# Patient Record
Sex: FEMALE | Race: OTHER | HISPANIC OR LATINO | URBAN - METROPOLITAN AREA
[De-identification: names, ages, dates, MRNs, and addresses within clinical notes are randomized per-mention and may not be internally consistent; named-entity substitution may affect disease eponyms.]

---

## 2017-01-03 ENCOUNTER — OUTPATIENT (OUTPATIENT)
Dept: OUTPATIENT SERVICES | Facility: HOSPITAL | Age: 32
LOS: 1 days | End: 2017-01-03
Payer: COMMERCIAL

## 2017-01-03 DIAGNOSIS — O47.03 FALSE LABOR BEFORE 37 COMPLETED WEEKS OF GESTATION, THIRD TRIMESTER: ICD-10-CM

## 2017-01-03 PROCEDURE — G0463: CPT

## 2017-01-03 PROCEDURE — 59025 FETAL NON-STRESS TEST: CPT

## 2017-01-03 PROCEDURE — 59050 FETAL MONITOR W/REPORT: CPT

## 2017-01-27 ENCOUNTER — OUTPATIENT (OUTPATIENT)
Dept: OUTPATIENT SERVICES | Facility: HOSPITAL | Age: 32
LOS: 1 days | End: 2017-01-27
Payer: COMMERCIAL

## 2017-01-27 DIAGNOSIS — O47.03 FALSE LABOR BEFORE 37 COMPLETED WEEKS OF GESTATION, THIRD TRIMESTER: ICD-10-CM

## 2017-01-27 PROCEDURE — G0463: CPT

## 2017-01-27 PROCEDURE — 59025 FETAL NON-STRESS TEST: CPT

## 2017-02-10 ENCOUNTER — INPATIENT (INPATIENT)
Facility: HOSPITAL | Age: 32
LOS: 5 days | Discharge: HOME | End: 2017-02-16
Attending: SPECIALIST | Admitting: SPECIALIST
Payer: COMMERCIAL

## 2017-02-10 VITALS — HEIGHT: 58 IN | WEIGHT: 158.73 LBS

## 2017-02-10 DIAGNOSIS — O47.1 FALSE LABOR AT OR AFTER 37 COMPLETED WEEKS OF GESTATION: ICD-10-CM

## 2017-02-10 LAB
BASOPHILS # BLD AUTO: 0 K/UL — SIGNIFICANT CHANGE UP (ref 0–0.2)
BASOPHILS NFR BLD AUTO: 0.1 % — SIGNIFICANT CHANGE UP (ref 0–2)
BLD GP AB SCN SERPL QL: SIGNIFICANT CHANGE UP
EOSINOPHIL # BLD AUTO: 0 K/UL — SIGNIFICANT CHANGE UP (ref 0–0.5)
EOSINOPHIL NFR BLD AUTO: 0.2 % — SIGNIFICANT CHANGE UP (ref 0–6)
HCT VFR BLD CALC: 39.1 % — SIGNIFICANT CHANGE UP (ref 37–47)
HGB BLD-MCNC: 13.3 G/DL — SIGNIFICANT CHANGE UP (ref 12–16)
LYMPHOCYTES # BLD AUTO: 18.4 % — LOW (ref 20–55)
LYMPHOCYTES # BLD AUTO: 2.4 K/UL — SIGNIFICANT CHANGE UP (ref 1–4.8)
MCHC RBC-ENTMCNC: 31.4 PG — HIGH (ref 27–31)
MCHC RBC-ENTMCNC: 34 G/DL — SIGNIFICANT CHANGE UP (ref 32–36)
MCV RBC AUTO: 92.4 FL — SIGNIFICANT CHANGE UP (ref 81–99)
MONOCYTES # BLD AUTO: 0.8 K/UL — SIGNIFICANT CHANGE UP (ref 0–0.8)
MONOCYTES NFR BLD AUTO: 6.4 % — SIGNIFICANT CHANGE UP (ref 3–10)
NEUTROPHILS # BLD AUTO: 9.6 K/UL — HIGH (ref 1.8–8)
NEUTROPHILS NFR BLD AUTO: 74.7 % — HIGH (ref 37–73)
PLATELET # BLD AUTO: 247 K/UL — SIGNIFICANT CHANGE UP (ref 150–400)
RBC # BLD: 4.23 M/UL — LOW (ref 4.4–5.2)
RBC # FLD: 14 % — SIGNIFICANT CHANGE UP (ref 11–15.6)
TYPE + AB SCN PNL BLD: SIGNIFICANT CHANGE UP
WBC # BLD: 12.86 K/UL — HIGH (ref 4.8–10.8)
WBC # FLD AUTO: 12.86 K/UL — HIGH (ref 4.8–10.8)

## 2017-02-10 RX ORDER — OXYTOCIN 10 UNIT/ML
333.33 VIAL (ML) INJECTION
Qty: 20 | Refills: 0 | Status: DISCONTINUED | OUTPATIENT
Start: 2017-02-10 | End: 2017-02-13

## 2017-02-10 RX ORDER — CITRIC ACID/SODIUM CITRATE 300-500 MG
15 SOLUTION, ORAL ORAL EVERY 4 HOURS
Qty: 0 | Refills: 0 | Status: DISCONTINUED | OUTPATIENT
Start: 2017-02-10 | End: 2017-02-13

## 2017-02-10 RX ORDER — SODIUM CHLORIDE 9 MG/ML
1000 INJECTION, SOLUTION INTRAVENOUS
Qty: 0 | Refills: 0 | Status: DISCONTINUED | OUTPATIENT
Start: 2017-02-10 | End: 2017-02-12

## 2017-02-10 RX ORDER — SODIUM CHLORIDE 9 MG/ML
1000 INJECTION, SOLUTION INTRAVENOUS ONCE
Qty: 0 | Refills: 0 | Status: COMPLETED | OUTPATIENT
Start: 2017-02-10 | End: 2017-02-12

## 2017-02-11 DIAGNOSIS — O36.8990 MATERNAL CARE FOR OTHER SPECIFIED FETAL PROBLEMS, UNSPECIFIED TRIMESTER, NOT APPLICABLE OR UNSPECIFIED: ICD-10-CM

## 2017-02-11 DIAGNOSIS — Z3A.38 38 WEEKS GESTATION OF PREGNANCY: ICD-10-CM

## 2017-02-11 DIAGNOSIS — O35.8XX9 MATERNAL CARE FOR OTHER (SUSPECTED) FETAL ABNORMALITY AND DAMAGE, OTHER FETUS: ICD-10-CM

## 2017-02-11 DIAGNOSIS — O99.213 OBESITY COMPLICATING PREGNANCY, THIRD TRIMESTER: ICD-10-CM

## 2017-02-11 DIAGNOSIS — E34.3 SHORT STATURE DUE TO ENDOCRINE DISORDER: ICD-10-CM

## 2017-02-11 LAB — RPR SERPL-ACNC: SIGNIFICANT CHANGE UP

## 2017-02-11 PROCEDURE — 99233 SBSQ HOSP IP/OBS HIGH 50: CPT | Mod: GC

## 2017-02-11 RX ADMIN — SODIUM CHLORIDE 125 MILLILITER(S): 9 INJECTION, SOLUTION INTRAVENOUS at 02:30

## 2017-02-11 NOTE — CONSULT NOTE ADULT - SUBJECTIVE AND OBJECTIVE BOX
Pt is 30y/o  with LMP of 2016 and SABINA 2017. EGA of 38 weeks and 1 day. Pt had a non-stress test yesterday which was suspicious for fetal heart rate deceleration. An office ultrasound examination reveal normal amniotic fluid volume. Pt sent to hospital for prolonged fetal heart monitoring. Review of fetal heart tracing by me today reveals episodic mild variable decelerations. She was diagnosed with a marginal umbilical cord insertion and a persistent fetal right umbilical vein on an ultrasound examination done on . She is of short statue and overweight (BMI is 32.0).       PAST MEDICAL & SURGICAL HISTORY: Unremarkable.     Allergies: No Known Allergies    FAMILY HISTORY:    Social History: Denies ETOH, smoking and drugs.       Vital Signs Last 24 Hrs  T(C): --36.8  HR: --96  BP: -- 124/83  RR: --16    Physical Exam:  General: NAD  CVS: RRR, +S1/S2  Lungs: CTAB, no wheeze, rhonchi or rales.   Abdomen: +BS, soft, NT. Gravid.  Pelvic: Deferred  Ext: No cyanosis, edema or calf tenderness. Blanching skin.    Labs:                          13.3   12.86 )-----------( 247      ( 10 Feb 2017 17:40 )             39.1       MEDICATIONS  (STANDING):  lactated ringers Bolus 1000milliLiter(s) IV Bolus once  lactated ringers. 1000milliLiter(s) IV Continuous <Continuous>  citric acid/sodium citrate Solution 15milliLiter(s) Oral every 4 hours  oxytocin Infusion 333.333milliUNIT(s)/Min IV Continuous <Continuous>  misoprostol Oral Solution 40MICROGram(s) Oral every 2 hours  misoprostol Oral Solution 60MICROGram(s) Oral every 2 hours  misoprostol Oral Solution 20MICROGram(s) Oral every 2 hours  misoprostol Oral Solution 40MICROGram(s) Oral every 2 hours  misoprostol Oral Solution 60MICROGram(s) Oral every 2 hours Pt is 30y/o  with LMP of 2016 and SABINA 2017. EGA of 38 weeks and 1 day. Pt had a non-stress test yesterday which was suspicious for fetal heart rate deceleration. An office ultrasound examination reveal normal amniotic fluid volume. Pt sent to hospital for prolonged fetal heart monitoring. Review of fetal heart tracing by me today reveals episodic mild variable decelerations. She was diagnosed with a marginal umbilical cord insertion and a persistent fetal right umbilical vein on an ultrasound examination done on . She is of short statue and overweight (BMI is 32.0).       PAST MEDICAL & SURGICAL HISTORY: LGSIL Pap smear. Cold sores.     Allergies: No Known Allergies    FAMILY HISTORY: Mother with diabetes.    Social History: Denies ETOH, smoking and drugs.       Vital Signs Last 24 Hrs  T(C): --36.8  HR: --96  BP: -- 124/83  RR: --16    Physical Exam:  General: NAD  CVS: RRR, +S1/S2  Lungs: CTAB, no wheeze, rhonchi or rales.   Abdomen: +BS, soft, NT. Gravid.  Pelvic: Deferred  Ext: No cyanosis, edema or calf tenderness. Blanching skin.    Labs:                          13.3   12.86 )-----------( 247      ( 10 Feb 2017 17:40 )             39.1       MEDICATIONS  (STANDING):  lactated ringers Bolus 1000milliLiter(s) IV Bolus once  lactated ringers. 1000milliLiter(s) IV Continuous <Continuous>  citric acid/sodium citrate Solution 15milliLiter(s) Oral every 4 hours  oxytocin Infusion 333.333milliUNIT(s)/Min IV Continuous <Continuous>  misoprostol Oral Solution 40MICROGram(s) Oral every 2 hours  misoprostol Oral Solution 60MICROGram(s) Oral every 2 hours  misoprostol Oral Solution 20MICROGram(s) Oral every 2 hours  misoprostol Oral Solution 40MICROGram(s) Oral every 2 hours  misoprostol Oral Solution 60MICROGram(s) Oral every 2 hours

## 2017-02-11 NOTE — CONSULT NOTE ADULT - PROBLEM SELECTOR RECOMMENDATION 3
Persistent right umbilical vein is considered to be a normal variant. Suggest labor induction due to episodic mild variable decelerations at 38 weeks of gestation.

## 2017-02-11 NOTE — CONSULT NOTE ADULT - PROBLEM SELECTOR RECOMMENDATION 9
Suggest labor induction due to episodic mild variable decelerations at 38 weeks of gestation. In the event of recurrent variable decelerations during the labor induction or early labor, a  delivery is suggested. Suggest labor induction due to the episodic mild variable decelerations at 38 weeks of gestation. In the event of recurrent variable decelerations during the labor induction or early labor, a  delivery is suggested

## 2017-02-11 NOTE — CONSULT NOTE ADULT - PROBLEM SELECTOR RECOMMENDATION 4
Suggest labor induction due to episodic mild variable decelerations at 38 weeks of gestation. Suggest labor induction due to episodic mild variable decelerations at 38 weeks of gestation

## 2017-02-11 NOTE — CONSULT NOTE ADULT - ASSESSMENT
Pt is 30y/o  with LMP of 2016 and SABINA 2017. EGA of 38 weeks and 1 day. Episodic variable decelerations most likely due to cord compression secondary to the marginal umbilical cord insertion. Fetal persistent right umbilical vein is considered to be a normal variant. She is short and overweight. Pt is 30y/o  with LMP of 2016 and SABINA 2017. EGA of 38 weeks and 1 day. Episodic fetal mild variable decelerations on fetal monitoring strip are most likely due to cord compression secondary to the marginal umbilical cord insertion. Fetal persistent right umbilical vein is considered to be a normal variant. She is short and overweight.

## 2017-02-11 NOTE — CONSULT NOTE ADULT - ATTENDING COMMENTS
Patient examined with resident. Recommendations discussed with resident, OB provider and L & D RN staff

## 2017-02-12 RX ORDER — CEFAZOLIN SODIUM 1 G
2000 VIAL (EA) INJECTION ONCE
Qty: 0 | Refills: 0 | Status: DISCONTINUED | OUTPATIENT
Start: 2017-02-12 | End: 2017-02-13

## 2017-02-12 RX ORDER — CITRIC ACID/SODIUM CITRATE 300-500 MG
30 SOLUTION, ORAL ORAL ONCE
Qty: 0 | Refills: 0 | Status: COMPLETED | OUTPATIENT
Start: 2017-02-12 | End: 2017-02-13

## 2017-02-12 RX ORDER — SODIUM CHLORIDE 9 MG/ML
1000 INJECTION, SOLUTION INTRAVENOUS
Qty: 0 | Refills: 0 | Status: DISCONTINUED | OUTPATIENT
Start: 2017-02-12 | End: 2017-02-13

## 2017-02-12 RX ADMIN — SODIUM CHLORIDE 125 MILLILITER(S): 9 INJECTION, SOLUTION INTRAVENOUS at 00:25

## 2017-02-12 RX ADMIN — SODIUM CHLORIDE 125 MILLILITER(S): 9 INJECTION, SOLUTION INTRAVENOUS at 13:00

## 2017-02-12 RX ADMIN — SODIUM CHLORIDE 125 MILLILITER(S): 9 INJECTION, SOLUTION INTRAVENOUS at 18:12

## 2017-02-12 RX ADMIN — SODIUM CHLORIDE 2000 MILLILITER(S): 9 INJECTION, SOLUTION INTRAVENOUS at 17:53

## 2017-02-13 ENCOUNTER — TRANSCRIPTION ENCOUNTER (OUTPATIENT)
Age: 32
End: 2017-02-13

## 2017-02-13 LAB
BASE EXCESS BLDCOA CALC-SCNC: -4.3 MMOL/L — SIGNIFICANT CHANGE UP (ref -11.6–0.4)
BASE EXCESS BLDCOV CALC-SCNC: -3.8 MMOL/L — SIGNIFICANT CHANGE UP (ref -9.3–0.3)
GAS PNL BLDCOV: 7.26 — SIGNIFICANT CHANGE UP (ref 7.11–7.36)
HCO3 BLDCOA-SCNC: 24 MMOL/L — SIGNIFICANT CHANGE UP (ref 15–27)
HCO3 BLDCOV-SCNC: 24 MMOL/L — SIGNIFICANT CHANGE UP (ref 17–25)
PCO2 BLDCOA: 60.8 MMHG — SIGNIFICANT CHANGE UP (ref 32.2–65.8)
PCO2 BLDCOV: 54.7 MMHG — HIGH (ref 27–49.4)
PH BLDCOA: 7.22 — SIGNIFICANT CHANGE UP (ref 7.11–7.36)
PO2 BLDCOA: 17 MMHG — SIGNIFICANT CHANGE UP (ref 6–30)
PO2 BLDCOA: 22 MMHG — SIGNIFICANT CHANGE UP (ref 17.4–41)
SAO2 % BLDCOA: SIGNIFICANT CHANGE UP
SAO2 % BLDCOV: SIGNIFICANT CHANGE UP

## 2017-02-13 RX ORDER — TETANUS TOXOID, REDUCED DIPHTHERIA TOXOID AND ACELLULAR PERTUSSIS VACCINE, ADSORBED 5; 2.5; 8; 8; 2.5 [IU]/.5ML; [IU]/.5ML; UG/.5ML; UG/.5ML; UG/.5ML
0.5 SUSPENSION INTRAMUSCULAR ONCE
Qty: 0 | Refills: 0 | Status: DISCONTINUED | OUTPATIENT
Start: 2017-02-13 | End: 2017-02-16

## 2017-02-13 RX ORDER — OXYTOCIN 10 UNIT/ML
333.33 VIAL (ML) INJECTION
Qty: 20 | Refills: 0 | Status: DISCONTINUED | OUTPATIENT
Start: 2017-02-13 | End: 2017-02-16

## 2017-02-13 RX ORDER — IBUPROFEN 200 MG
600 TABLET ORAL EVERY 6 HOURS
Qty: 0 | Refills: 0 | Status: DISCONTINUED | OUTPATIENT
Start: 2017-02-13 | End: 2017-02-16

## 2017-02-13 RX ORDER — DIPHENHYDRAMINE HCL 50 MG
25 CAPSULE ORAL EVERY 4 HOURS
Qty: 0 | Refills: 0 | Status: DISCONTINUED | OUTPATIENT
Start: 2017-02-13 | End: 2017-02-16

## 2017-02-13 RX ORDER — KETOROLAC TROMETHAMINE 30 MG/ML
30 SYRINGE (ML) INJECTION ONCE
Qty: 0 | Refills: 0 | Status: DISCONTINUED | OUTPATIENT
Start: 2017-02-13 | End: 2017-02-16

## 2017-02-13 RX ORDER — NALBUPHINE HYDROCHLORIDE 10 MG/ML
5 INJECTION, SOLUTION INTRAMUSCULAR; INTRAVENOUS; SUBCUTANEOUS ONCE
Qty: 0 | Refills: 0 | Status: DISCONTINUED | OUTPATIENT
Start: 2017-02-13 | End: 2017-02-16

## 2017-02-13 RX ORDER — ONDANSETRON 8 MG/1
4 TABLET, FILM COATED ORAL EVERY 6 HOURS
Qty: 0 | Refills: 0 | Status: DISCONTINUED | OUTPATIENT
Start: 2017-02-13 | End: 2017-02-16

## 2017-02-13 RX ORDER — KETOROLAC TROMETHAMINE 30 MG/ML
30 SYRINGE (ML) INJECTION EVERY 6 HOURS
Qty: 0 | Refills: 0 | Status: DISCONTINUED | OUTPATIENT
Start: 2017-02-13 | End: 2017-02-16

## 2017-02-13 RX ORDER — SODIUM CHLORIDE 9 MG/ML
1000 INJECTION, SOLUTION INTRAVENOUS
Qty: 0 | Refills: 0 | Status: DISCONTINUED | OUTPATIENT
Start: 2017-02-13 | End: 2017-02-16

## 2017-02-13 RX ORDER — OXYTOCIN 10 UNIT/ML
41.67 VIAL (ML) INJECTION
Qty: 20 | Refills: 0 | Status: DISCONTINUED | OUTPATIENT
Start: 2017-02-13 | End: 2017-02-16

## 2017-02-13 RX ORDER — ONDANSETRON 8 MG/1
4 TABLET, FILM COATED ORAL ONCE
Qty: 0 | Refills: 0 | Status: DISCONTINUED | OUTPATIENT
Start: 2017-02-13 | End: 2017-02-16

## 2017-02-13 RX ORDER — FERROUS SULFATE 325(65) MG
325 TABLET ORAL DAILY
Qty: 0 | Refills: 0 | Status: DISCONTINUED | OUTPATIENT
Start: 2017-02-13 | End: 2017-02-16

## 2017-02-13 RX ORDER — SIMETHICONE 80 MG/1
80 TABLET, CHEWABLE ORAL EVERY 4 HOURS
Qty: 0 | Refills: 0 | Status: DISCONTINUED | OUTPATIENT
Start: 2017-02-13 | End: 2017-02-16

## 2017-02-13 RX ORDER — DOCUSATE SODIUM 100 MG
100 CAPSULE ORAL
Qty: 0 | Refills: 0 | Status: DISCONTINUED | OUTPATIENT
Start: 2017-02-13 | End: 2017-02-16

## 2017-02-13 RX ORDER — ACETAMINOPHEN 500 MG
1000 TABLET ORAL ONCE
Qty: 0 | Refills: 0 | Status: COMPLETED | OUTPATIENT
Start: 2017-02-13 | End: 2017-02-13

## 2017-02-13 RX ORDER — NALBUPHINE HYDROCHLORIDE 10 MG/ML
2.5 INJECTION, SOLUTION INTRAMUSCULAR; INTRAVENOUS; SUBCUTANEOUS EVERY 6 HOURS
Qty: 0 | Refills: 0 | Status: DISCONTINUED | OUTPATIENT
Start: 2017-02-13 | End: 2017-02-16

## 2017-02-13 RX ORDER — DIPHENHYDRAMINE HCL 50 MG
25 CAPSULE ORAL EVERY 6 HOURS
Qty: 0 | Refills: 0 | Status: DISCONTINUED | OUTPATIENT
Start: 2017-02-13 | End: 2017-02-16

## 2017-02-13 RX ORDER — GLYCERIN ADULT
1 SUPPOSITORY, RECTAL RECTAL AT BEDTIME
Qty: 0 | Refills: 0 | Status: DISCONTINUED | OUTPATIENT
Start: 2017-02-13 | End: 2017-02-16

## 2017-02-13 RX ORDER — NALOXONE HYDROCHLORIDE 4 MG/.1ML
0.1 SPRAY NASAL
Qty: 0 | Refills: 0 | Status: DISCONTINUED | OUTPATIENT
Start: 2017-02-13 | End: 2017-02-16

## 2017-02-13 RX ORDER — LANOLIN
1 OINTMENT (GRAM) TOPICAL
Qty: 0 | Refills: 0 | Status: DISCONTINUED | OUTPATIENT
Start: 2017-02-13 | End: 2017-02-16

## 2017-02-13 RX ORDER — ACETAMINOPHEN 500 MG
650 TABLET ORAL EVERY 6 HOURS
Qty: 0 | Refills: 0 | Status: DISCONTINUED | OUTPATIENT
Start: 2017-02-13 | End: 2017-02-16

## 2017-02-13 RX ADMIN — Medication 30 MILLILITER(S): at 08:38

## 2017-02-13 RX ADMIN — Medication 1000 MILLIGRAM(S): at 13:35

## 2017-02-13 RX ADMIN — Medication 100 MILLIGRAM(S): at 09:17

## 2017-02-13 RX ADMIN — SODIUM CHLORIDE 125 MILLILITER(S): 9 INJECTION, SOLUTION INTRAVENOUS at 17:53

## 2017-02-13 RX ADMIN — Medication 400 MILLIGRAM(S): at 12:45

## 2017-02-13 RX ADMIN — Medication 125 MILLIUNIT(S)/MIN: at 14:28

## 2017-02-13 NOTE — DISCHARGE NOTE OB - PATIENT PORTAL LINK FT
“You can access the [employer] Patient Portal, offered by [facility], by registering with the following website: [email or web address]”

## 2017-02-13 NOTE — DISCHARGE NOTE OB - CARE PROVIDER_API CALL
Lisbeth Blas), Obstetrics and Gynecology  [address] [place] [address]  Phone: [phone]  Fax: [phone]

## 2017-02-13 NOTE — DISCHARGE NOTE OB - CARE PLAN
Principal Discharge DX:	 delivery delivered  Goal:	Recover  Instructions for follow-up, activity and diet:	Call for fevers, chills, severe pain, heavy vaginal bleeding, nausea, vomiting, or any other concerning symptoms.  Nothing in vagina and no heavy lifting x 6 weeks.  No driving x 2 weeks.  F/U 2 weeks for incision check.  Secondary Diagnosis:	Other iron deficiency anemia

## 2017-02-13 NOTE — DISCHARGE NOTE OB - MEDICATION SUMMARY - MEDICATIONS TO TAKE
I will START or STAY ON the medications listed below when I get home from the hospital:    acetaminophen-oxyCODONE 325 mg-5 mg oral tablet  -- 2 tab(s) by mouth every 4 hours -for moderate pain MDD:12 tabs  -- Caution federal law prohibits the transfer of this drug to any person other  than the person for whom it was prescribed.  May cause drowsiness.  Alcohol may intensify this effect.  Use care when operating dangerous machinery.  This prescription cannot be refilled.  This product contains acetaminophen.  Do not use  with any other product containing acetaminophen to prevent possible liver damage.  Using more of this medication than prescribed may cause serious breathing problems.    -- Indication: For pain    ibuprofen 600 mg oral tablet  -- 1 tab(s) by mouth every 6 hours -for mild pain  -- Do not take this drug if you are pregnant.  It is very important that you take or use this exactly as directed.  Do not skip doses or discontinue unless directed by your doctor.  May cause drowsiness or dizziness.  Obtain medical advice before taking any non-prescription drugs as some may affect the action of this medication.  Take with food or milk.    -- Indication: For pain

## 2017-02-13 NOTE — DISCHARGE NOTE OB - HOSPITAL COURSE
Pt admitted for IOL. She received cytotec and pitocin and never progressed. Pt underwent primary C/S. Had an uncomplicated delivery and PP course.

## 2017-02-13 NOTE — DISCHARGE NOTE OB - PLAN OF CARE
Recover Call for fevers, chills, severe pain, heavy vaginal bleeding, nausea, vomiting, or any other concerning symptoms.  Nothing in vagina and no heavy lifting x 6 weeks.  No driving x 2 weeks.  F/U 2 weeks for incision check.

## 2017-02-13 NOTE — DISCHARGE NOTE OB - MATERIALS PROVIDED
Breastfeeding Mother’s Support Group Information/Guide to Postpartum Care/Birth Certificate Instructions/Tdap Vaccination (VIS Pub Date: 2012)/MMR Vaccination (VIS Pub Date: 2012)/Breastfeeding Guide and Packet/[facility]  Screening Program/Shaken Baby Prevention Handout/[facility] Hearing Screen Program

## 2017-02-14 ENCOUNTER — TRANSCRIPTION ENCOUNTER (OUTPATIENT)
Age: 32
End: 2017-02-14

## 2017-02-14 LAB
BASOPHILS # BLD AUTO: 0 K/UL — SIGNIFICANT CHANGE UP (ref 0–0.2)
BASOPHILS NFR BLD AUTO: 0.1 % — SIGNIFICANT CHANGE UP (ref 0–2)
EOSINOPHIL # BLD AUTO: 0 K/UL — SIGNIFICANT CHANGE UP (ref 0–0.5)
EOSINOPHIL NFR BLD AUTO: 0.3 % — SIGNIFICANT CHANGE UP (ref 0–6)
HCT VFR BLD CALC: 29.8 % — LOW (ref 37–47)
HGB BLD-MCNC: 10 G/DL — LOW (ref 12–16)
LYMPHOCYTES # BLD AUTO: 2.3 K/UL — SIGNIFICANT CHANGE UP (ref 1–4.8)
LYMPHOCYTES # BLD AUTO: 20.3 % — SIGNIFICANT CHANGE UP (ref 20–55)
MCHC RBC-ENTMCNC: 31.1 PG — HIGH (ref 27–31)
MCHC RBC-ENTMCNC: 33.6 G/DL — SIGNIFICANT CHANGE UP (ref 32–36)
MCV RBC AUTO: 92.5 FL — SIGNIFICANT CHANGE UP (ref 81–99)
MONOCYTES # BLD AUTO: 1.1 K/UL — HIGH (ref 0–0.8)
MONOCYTES NFR BLD AUTO: 9.2 % — SIGNIFICANT CHANGE UP (ref 3–10)
NEUTROPHILS # BLD AUTO: 8.1 K/UL — HIGH (ref 1.8–8)
NEUTROPHILS NFR BLD AUTO: 69.8 % — SIGNIFICANT CHANGE UP (ref 37–73)
PLATELET # BLD AUTO: 177 K/UL — SIGNIFICANT CHANGE UP (ref 150–400)
RBC # BLD: 3.22 M/UL — LOW (ref 4.4–5.2)
RBC # FLD: 14.2 % — SIGNIFICANT CHANGE UP (ref 11–15.6)
WBC # BLD: 11.53 K/UL — HIGH (ref 4.8–10.8)
WBC # FLD AUTO: 11.53 K/UL — HIGH (ref 4.8–10.8)

## 2017-02-14 RX ADMIN — Medication 325 MILLIGRAM(S): at 12:11

## 2017-02-14 RX ADMIN — Medication 600 MILLIGRAM(S): at 12:11

## 2017-02-14 RX ADMIN — Medication 1 TABLET(S): at 12:11

## 2017-02-14 RX ADMIN — Medication 100 MILLIGRAM(S): at 12:11

## 2017-02-14 RX ADMIN — Medication 600 MILLIGRAM(S): at 13:01

## 2017-02-14 RX ADMIN — Medication 30 MILLIGRAM(S): at 03:52

## 2017-02-14 RX ADMIN — SIMETHICONE 80 MILLIGRAM(S): 80 TABLET, CHEWABLE ORAL at 12:11

## 2017-02-14 RX ADMIN — Medication 600 MILLIGRAM(S): at 17:50

## 2017-02-14 NOTE — PROGRESS NOTE ADULT - SUBJECTIVE AND OBJECTIVE BOX
~Postpartum Note,  Section, /PRIMARY; MALE ; RH POSITIVE/NEGATIVE;   She is a  31y woman who is now post-operative day:     Subjective:  The patient feels well.  She is ambulating.   She is tolerating regular diet.      Physical exam:    Vital Signs Last 24 Hrs  T(C): 36.9, Max: 37.2 (02-13 @ 20:57)  T(F): 98.4, Max: 99 (02-13 @ 20:57)  HR: 76 (67 - 89)  BP: 108/71 (91/60 - 120/58)  BP(mean): --  RR: 18 (17 - 20)  SpO2: 96% (96% - 98%)    Gen: NAD    Abdomen: Soft, nontender, no distension , firm uterine fundus at umbilicus.  Incision: Clean, dry, and intact with steri strips  Pelvic: Normal lochia noted  Ext: No calf tenderness    LABS:                        10.0   11.53 )-----------( 177      ( 2017 07:12 )             29.8       Rubella status: Immune           Assessment and Plan  POD # 1  s/p  section   Doing well.  Encourage ambulation.

## 2017-02-15 RX ADMIN — Medication 325 MILLIGRAM(S): at 12:09

## 2017-02-15 RX ADMIN — Medication 1 TABLET(S): at 12:08

## 2017-02-15 RX ADMIN — Medication 600 MILLIGRAM(S): at 22:30

## 2017-02-15 RX ADMIN — Medication 600 MILLIGRAM(S): at 02:45

## 2017-02-15 RX ADMIN — Medication 600 MILLIGRAM(S): at 21:37

## 2017-02-15 RX ADMIN — Medication 600 MILLIGRAM(S): at 12:09

## 2017-02-15 RX ADMIN — Medication 600 MILLIGRAM(S): at 03:40

## 2017-02-15 RX ADMIN — Medication 600 MILLIGRAM(S): at 13:10

## 2017-02-15 RX ADMIN — Medication 100 MILLIGRAM(S): at 12:08

## 2017-02-15 NOTE — PROGRESS NOTE ADULT - SUBJECTIVE AND OBJECTIVE BOX
POD#2 s/p  section  male  O POSITIVE/Rubella Immune    S:  The patient has no complaints.  She is ambulating, voiding, tolerating PO, +flatus.    O:      T(C): 36.7, Max: 36.7 (02-14 @ 22:12)  HR: 93 (77 - 93)  BP: 120/83 (111/67 - 120/83)  RR: 18 (18 - 20)  SpO2: 95% (95% - 95%)    Hematocrit: 39.1 % -->Hematocrit: 29.8 %    Chest:  Clear to auscultation bilaterally.  CVS:  +S1, S2.  Abdomen:  Soft, non-tender, non-distended, +bowel sounds.  Uterus:  Fundus firm below umbilicus.  Incision:  Clean/dry/intact  VE:  +Lochia  Ext:  Non-tender.    A/P:  POD#2  -Stable  -Advance care.  -Continue routine postpartum/postoperative care and education.  -Declines circumcision.

## 2017-02-16 VITALS
DIASTOLIC BLOOD PRESSURE: 70 MMHG | SYSTOLIC BLOOD PRESSURE: 107 MMHG | HEART RATE: 66 BPM | TEMPERATURE: 98 F | RESPIRATION RATE: 18 BRPM

## 2017-02-16 PROCEDURE — 82803 BLOOD GASES ANY COMBINATION: CPT

## 2017-02-16 PROCEDURE — 85027 COMPLETE CBC AUTOMATED: CPT

## 2017-02-16 PROCEDURE — 86592 SYPHILIS TEST NON-TREP QUAL: CPT

## 2017-02-16 PROCEDURE — 36415 COLL VENOUS BLD VENIPUNCTURE: CPT

## 2017-02-16 PROCEDURE — 86900 BLOOD TYPING SEROLOGIC ABO: CPT

## 2017-02-16 PROCEDURE — 86850 RBC ANTIBODY SCREEN: CPT

## 2017-02-16 PROCEDURE — 86901 BLOOD TYPING SEROLOGIC RH(D): CPT

## 2017-02-16 RX ORDER — OXYCODONE HYDROCHLORIDE 5 MG/1
2 TABLET ORAL
Qty: 60 | Refills: 0 | OUTPATIENT
Start: 2017-02-16 | End: 2017-02-21

## 2017-02-16 RX ORDER — IBUPROFEN 200 MG
1 TABLET ORAL
Qty: 60 | Refills: 0 | OUTPATIENT
Start: 2017-02-16 | End: 2017-02-23

## 2017-02-16 RX ADMIN — Medication 600 MILLIGRAM(S): at 05:46

## 2017-02-16 RX ADMIN — Medication 600 MILLIGRAM(S): at 04:44

## 2017-02-16 RX ADMIN — Medication 325 MILLIGRAM(S): at 11:47

## 2017-02-16 RX ADMIN — Medication 600 MILLIGRAM(S): at 11:47

## 2017-02-16 RX ADMIN — Medication 1 TABLET(S): at 11:47

## 2017-02-16 NOTE — PROGRESS NOTE ADULT - SUBJECTIVE AND OBJECTIVE BOX
Postpartum Note,  Section  She is a  31y woman who is now post-operative day # 3    Subjective:  The patient feels well.  She is ambulating without difficulty.  She is tolerating PO.  She is voiding.  She denies nausea and vomiting.  Her pain is controlled.  She reports normal postpartum bleeding      Physical exam:    Vital Signs Last 24 Hrs  T(C): 36.8, Max: 37.1 (02-15 @ 21:45)  T(F): 98.2, Max: 98.8 (02-15 @ 21:45)  HR: 66 (66 - 77)  BP: 107/70 (104/67 - 107/70)  BP(mean): --  RR: 18 (18 - 20)  SpO2: 96% (96% - 96%)    Gen: NAD  Abdomen: Soft, nontender, no distension , firm uterine fundus at umbilicus.  Incision: Clean, dry, and intact with steri strips  Pelvic: Normal lochia noted  Ext: No calf tenderness    LABS: HCT 29.8

## 2017-02-16 NOTE — PROGRESS NOTE ADULT - ASSESSMENT
POD # 3 s/p primary C/S for failed induction    1) Doing well.  Encourage ambulation.  Discharge home today.  Prescriptions for percocet and motrin electronically sent to the pharmacy.  F/U in 2 weeks for incision check.  Call for fevers, chills, nausea, vomiting, heavy vaginal bleeding, vaginal discharge, severe pain, symptoms of depression, problems with incision or any other concerning symptoms.  Nothing in vagina and no heavy lifting x 6 weeks.  No driving x 2 weeks.  Questions answered.    2) Asymptomatic post anemia. Iron at dischrge.

## 2017-07-27 ENCOUNTER — RESULT REVIEW (OUTPATIENT)
Age: 32
End: 2017-07-27

## 2018-02-13 ENCOUNTER — ASOB RESULT (OUTPATIENT)
Age: 33
End: 2018-02-13

## 2018-02-13 ENCOUNTER — APPOINTMENT (OUTPATIENT)
Dept: MATERNAL FETAL MEDICINE | Facility: CLINIC | Age: 33
End: 2018-02-13
Payer: COMMERCIAL

## 2018-02-13 VITALS — BODY MASS INDEX: 32.95 KG/M2 | WEIGHT: 157 LBS | HEIGHT: 58 IN

## 2018-02-13 PROCEDURE — G0108 DIAB MANAGE TRN  PER INDIV: CPT

## 2018-02-15 ENCOUNTER — OTHER (OUTPATIENT)
Age: 33
End: 2018-02-15

## 2018-02-21 ENCOUNTER — APPOINTMENT (OUTPATIENT)
Dept: MATERNAL FETAL MEDICINE | Facility: CLINIC | Age: 33
End: 2018-02-21
Payer: COMMERCIAL

## 2018-02-21 ENCOUNTER — ASOB RESULT (OUTPATIENT)
Age: 33
End: 2018-02-21

## 2018-02-21 ENCOUNTER — APPOINTMENT (OUTPATIENT)
Dept: ANTEPARTUM | Facility: CLINIC | Age: 33
End: 2018-02-21
Payer: COMMERCIAL

## 2018-02-21 VITALS
HEART RATE: 82 BPM | DIASTOLIC BLOOD PRESSURE: 70 MMHG | WEIGHT: 154 LBS | HEIGHT: 58 IN | SYSTOLIC BLOOD PRESSURE: 114 MMHG | BODY MASS INDEX: 32.32 KG/M2 | OXYGEN SATURATION: 98 %

## 2018-02-21 DIAGNOSIS — O35.9XX0 MATERNAL CARE FOR (SUSPECTED) FETAL ABNORMALITY AND DAMAGE, UNSPECIFIED, NOT APPLICABLE OR UNSPECIFIED: ICD-10-CM

## 2018-02-21 DIAGNOSIS — Z83.3 FAMILY HISTORY OF DIABETES MELLITUS: ICD-10-CM

## 2018-02-21 DIAGNOSIS — O43.102 MALFORMATION OF PLACENTA, UNSPECIFIED, SECOND TRIMESTER: ICD-10-CM

## 2018-02-21 DIAGNOSIS — B00.9 HERPESVIRAL INFECTION, UNSPECIFIED: ICD-10-CM

## 2018-02-21 DIAGNOSIS — Z3A.22 22 WEEKS GESTATION OF PREGNANCY: ICD-10-CM

## 2018-02-21 DIAGNOSIS — O43.122 VELAMENTOUS INSERTION OF UMBILICAL CORD, SECOND TRIMESTER: ICD-10-CM

## 2018-02-21 PROCEDURE — 76819 FETAL BIOPHYS PROFIL W/O NST: CPT

## 2018-02-21 PROCEDURE — 76820 UMBILICAL ARTERY ECHO: CPT

## 2018-02-21 PROCEDURE — 76816 OB US FOLLOW-UP PER FETUS: CPT

## 2018-02-21 PROCEDURE — 93975 VASCULAR STUDY: CPT

## 2018-02-21 PROCEDURE — 99214 OFFICE O/P EST MOD 30 MIN: CPT

## 2018-03-01 ENCOUNTER — APPOINTMENT (OUTPATIENT)
Dept: MATERNAL FETAL MEDICINE | Facility: CLINIC | Age: 33
End: 2018-03-01
Payer: COMMERCIAL

## 2018-03-01 ENCOUNTER — ASOB RESULT (OUTPATIENT)
Age: 33
End: 2018-03-01

## 2018-03-01 VITALS — WEIGHT: 154.19 LBS | BODY MASS INDEX: 32.37 KG/M2 | HEIGHT: 58 IN

## 2018-03-01 PROCEDURE — G0108 DIAB MANAGE TRN  PER INDIV: CPT

## 2018-03-08 ENCOUNTER — OUTPATIENT (OUTPATIENT)
Dept: OUTPATIENT SERVICES | Facility: HOSPITAL | Age: 33
LOS: 1 days | End: 2018-03-08
Payer: COMMERCIAL

## 2018-03-08 VITALS — WEIGHT: 138.89 LBS

## 2018-03-08 DIAGNOSIS — Z01.818 ENCOUNTER FOR OTHER PREPROCEDURAL EXAMINATION: ICD-10-CM

## 2018-03-08 LAB
ANION GAP SERPL CALC-SCNC: 13 MMOL/L — SIGNIFICANT CHANGE UP (ref 5–17)
APPEARANCE UR: CLEAR — SIGNIFICANT CHANGE UP
BASOPHILS # BLD AUTO: 0 K/UL — SIGNIFICANT CHANGE UP (ref 0–0.2)
BASOPHILS NFR BLD AUTO: 0.2 % — SIGNIFICANT CHANGE UP (ref 0–2)
BILIRUB UR-MCNC: NEGATIVE — SIGNIFICANT CHANGE UP
BLD GP AB SCN SERPL QL: SIGNIFICANT CHANGE UP
BUN SERPL-MCNC: 11 MG/DL — SIGNIFICANT CHANGE UP (ref 8–20)
CALCIUM SERPL-MCNC: 9.3 MG/DL — SIGNIFICANT CHANGE UP (ref 8.6–10.2)
CHLORIDE SERPL-SCNC: 105 MMOL/L — SIGNIFICANT CHANGE UP (ref 98–107)
CO2 SERPL-SCNC: 20 MMOL/L — LOW (ref 22–29)
COLOR SPEC: YELLOW — SIGNIFICANT CHANGE UP
CREAT SERPL-MCNC: 0.6 MG/DL — SIGNIFICANT CHANGE UP (ref 0.5–1.3)
DIFF PNL FLD: NEGATIVE — SIGNIFICANT CHANGE UP
EOSINOPHIL # BLD AUTO: 0.1 K/UL — SIGNIFICANT CHANGE UP (ref 0–0.5)
EOSINOPHIL NFR BLD AUTO: 0.6 % — SIGNIFICANT CHANGE UP (ref 0–6)
EPI CELLS # UR: ABNORMAL
GLUCOSE SERPL-MCNC: 68 MG/DL — LOW (ref 70–115)
GLUCOSE UR QL: NEGATIVE MG/DL — SIGNIFICANT CHANGE UP
HCT VFR BLD CALC: 39.3 % — SIGNIFICANT CHANGE UP (ref 37–47)
HGB BLD-MCNC: 13.3 G/DL — SIGNIFICANT CHANGE UP (ref 12–16)
KETONES UR-MCNC: NEGATIVE — SIGNIFICANT CHANGE UP
LEUKOCYTE ESTERASE UR-ACNC: ABNORMAL
LYMPHOCYTES # BLD AUTO: 2.1 K/UL — SIGNIFICANT CHANGE UP (ref 1–4.8)
LYMPHOCYTES # BLD AUTO: 20.8 % — SIGNIFICANT CHANGE UP (ref 20–55)
MCHC RBC-ENTMCNC: 31.3 PG — HIGH (ref 27–31)
MCHC RBC-ENTMCNC: 33.8 G/DL — SIGNIFICANT CHANGE UP (ref 32–36)
MCV RBC AUTO: 92.5 FL — SIGNIFICANT CHANGE UP (ref 81–99)
MONOCYTES # BLD AUTO: 0.6 K/UL — SIGNIFICANT CHANGE UP (ref 0–0.8)
MONOCYTES NFR BLD AUTO: 5.4 % — SIGNIFICANT CHANGE UP (ref 3–10)
NEUTROPHILS # BLD AUTO: 7.5 K/UL — SIGNIFICANT CHANGE UP (ref 1.8–8)
NEUTROPHILS NFR BLD AUTO: 72.7 % — SIGNIFICANT CHANGE UP (ref 37–73)
NITRITE UR-MCNC: NEGATIVE — SIGNIFICANT CHANGE UP
PH UR: 7 — SIGNIFICANT CHANGE UP (ref 5–8)
PLATELET # BLD AUTO: 213 K/UL — SIGNIFICANT CHANGE UP (ref 150–400)
POTASSIUM SERPL-MCNC: 4.4 MMOL/L — SIGNIFICANT CHANGE UP (ref 3.5–5.3)
POTASSIUM SERPL-SCNC: 4.4 MMOL/L — SIGNIFICANT CHANGE UP (ref 3.5–5.3)
PROT UR-MCNC: NEGATIVE MG/DL — SIGNIFICANT CHANGE UP
RBC # BLD: 4.25 M/UL — LOW (ref 4.4–5.2)
RBC # FLD: 14.4 % — SIGNIFICANT CHANGE UP (ref 11–15.6)
SODIUM SERPL-SCNC: 138 MMOL/L — SIGNIFICANT CHANGE UP (ref 135–145)
SP GR SPEC: 1 — LOW (ref 1.01–1.02)
TYPE + AB SCN PNL BLD: SIGNIFICANT CHANGE UP
UROBILINOGEN FLD QL: NEGATIVE MG/DL — SIGNIFICANT CHANGE UP
WBC # BLD: 10.3 K/UL — SIGNIFICANT CHANGE UP (ref 4.8–10.8)
WBC # FLD AUTO: 10.3 K/UL — SIGNIFICANT CHANGE UP (ref 4.8–10.8)
WBC UR QL: SIGNIFICANT CHANGE UP /HPF (ref 0–5)

## 2018-03-08 PROCEDURE — 80048 BASIC METABOLIC PNL TOTAL CA: CPT

## 2018-03-08 PROCEDURE — 86901 BLOOD TYPING SEROLOGIC RH(D): CPT

## 2018-03-08 PROCEDURE — 86850 RBC ANTIBODY SCREEN: CPT

## 2018-03-08 PROCEDURE — 36415 COLL VENOUS BLD VENIPUNCTURE: CPT

## 2018-03-08 PROCEDURE — 86900 BLOOD TYPING SEROLOGIC ABO: CPT

## 2018-03-08 PROCEDURE — 81001 URINALYSIS AUTO W/SCOPE: CPT

## 2018-03-08 PROCEDURE — G0463: CPT

## 2018-03-08 PROCEDURE — 85027 COMPLETE CBC AUTOMATED: CPT

## 2018-03-19 ENCOUNTER — TRANSCRIPTION ENCOUNTER (OUTPATIENT)
Age: 33
End: 2018-03-19

## 2018-03-20 ENCOUNTER — TRANSCRIPTION ENCOUNTER (OUTPATIENT)
Age: 33
End: 2018-03-20

## 2018-03-20 ENCOUNTER — RESULT REVIEW (OUTPATIENT)
Age: 33
End: 2018-03-20

## 2018-03-20 ENCOUNTER — INPATIENT (INPATIENT)
Facility: HOSPITAL | Age: 33
LOS: 2 days | Discharge: HOME | End: 2018-03-23
Attending: SPECIALIST | Admitting: SPECIALIST
Payer: COMMERCIAL

## 2018-03-20 VITALS — WEIGHT: 152.12 LBS

## 2018-03-20 DIAGNOSIS — O34.219 MATERNAL CARE FOR UNSPECIFIED TYPE SCAR FROM PREVIOUS CESAREAN DELIVERY: ICD-10-CM

## 2018-03-20 LAB
BASE EXCESS BLDCOA CALC-SCNC: -3.7 MMOL/L — LOW (ref -2–2)
BASE EXCESS BLDCOV CALC-SCNC: -3.1 MMOL/L — LOW (ref -2–2)
BLD GP AB SCN SERPL QL: SIGNIFICANT CHANGE UP
GAS PNL BLDCOV: 7.33 — SIGNIFICANT CHANGE UP (ref 7.25–7.45)
HCO3 BLDCOA-SCNC: 20 MMOL/L — LOW (ref 21–29)
HCO3 BLDCOV-SCNC: 21 MMOL/L — SIGNIFICANT CHANGE UP (ref 21–29)
PCO2 BLDCOA: 50.2 MMHG — SIGNIFICANT CHANGE UP (ref 32–68)
PCO2 BLDCOV: 44 MMHG — SIGNIFICANT CHANGE UP (ref 29–53)
PH BLDCOA: 7.28 — SIGNIFICANT CHANGE UP (ref 7.18–7.38)
PO2 BLDCOA: 16.3 MMHG — SIGNIFICANT CHANGE UP (ref 5.7–30.5)
PO2 BLDCOA: 24.5 MMHG — SIGNIFICANT CHANGE UP (ref 17–41)
SAO2 % BLDCOA: SIGNIFICANT CHANGE UP
SAO2 % BLDCOV: SIGNIFICANT CHANGE UP
TYPE + AB SCN PNL BLD: SIGNIFICANT CHANGE UP

## 2018-03-20 PROCEDURE — 88305 TISSUE EXAM BY PATHOLOGIST: CPT | Mod: 26

## 2018-03-20 PROCEDURE — 88304 TISSUE EXAM BY PATHOLOGIST: CPT | Mod: 26

## 2018-03-20 PROCEDURE — 58611 LIGATE OVIDUCT(S) ADD-ON: CPT | Mod: 80

## 2018-03-20 PROCEDURE — 59514 CESAREAN DELIVERY ONLY: CPT | Mod: 80,U9

## 2018-03-20 RX ORDER — LANOLIN
1 OINTMENT (GRAM) TOPICAL
Qty: 0 | Refills: 0 | Status: DISCONTINUED | OUTPATIENT
Start: 2018-03-20 | End: 2018-03-23

## 2018-03-20 RX ORDER — GLYCERIN ADULT
1 SUPPOSITORY, RECTAL RECTAL AT BEDTIME
Qty: 0 | Refills: 0 | Status: DISCONTINUED | OUTPATIENT
Start: 2018-03-20 | End: 2018-03-23

## 2018-03-20 RX ORDER — IBUPROFEN 200 MG
600 TABLET ORAL EVERY 6 HOURS
Qty: 0 | Refills: 0 | Status: DISCONTINUED | OUTPATIENT
Start: 2018-03-20 | End: 2018-03-23

## 2018-03-20 RX ORDER — SODIUM CHLORIDE 9 MG/ML
1000 INJECTION, SOLUTION INTRAVENOUS
Qty: 0 | Refills: 0 | Status: DISCONTINUED | OUTPATIENT
Start: 2018-03-20 | End: 2018-03-20

## 2018-03-20 RX ORDER — CEFAZOLIN SODIUM 1 G
2000 VIAL (EA) INJECTION ONCE
Qty: 0 | Refills: 0 | Status: COMPLETED | OUTPATIENT
Start: 2018-03-20 | End: 2018-03-20

## 2018-03-20 RX ORDER — TETANUS TOXOID, REDUCED DIPHTHERIA TOXOID AND ACELLULAR PERTUSSIS VACCINE, ADSORBED 5; 2.5; 8; 8; 2.5 [IU]/.5ML; [IU]/.5ML; UG/.5ML; UG/.5ML; UG/.5ML
0.5 SUSPENSION INTRAMUSCULAR ONCE
Qty: 0 | Refills: 0 | Status: DISCONTINUED | OUTPATIENT
Start: 2018-03-20 | End: 2018-03-23

## 2018-03-20 RX ORDER — OXYTOCIN 10 UNIT/ML
41.67 VIAL (ML) INJECTION
Qty: 20 | Refills: 0 | Status: DISCONTINUED | OUTPATIENT
Start: 2018-03-20 | End: 2018-03-23

## 2018-03-20 RX ORDER — OXYTOCIN 10 UNIT/ML
333.33 VIAL (ML) INJECTION
Qty: 20 | Refills: 0 | Status: COMPLETED | OUTPATIENT
Start: 2018-03-20 | End: 2018-03-20

## 2018-03-20 RX ORDER — KETOROLAC TROMETHAMINE 30 MG/ML
30 SYRINGE (ML) INJECTION EVERY 6 HOURS
Qty: 0 | Refills: 0 | Status: DISCONTINUED | OUTPATIENT
Start: 2018-03-20 | End: 2018-03-23

## 2018-03-20 RX ORDER — NALOXONE HYDROCHLORIDE 4 MG/.1ML
0.1 SPRAY NASAL
Qty: 0 | Refills: 0 | Status: DISCONTINUED | OUTPATIENT
Start: 2018-03-20 | End: 2018-03-23

## 2018-03-20 RX ORDER — DIPHENHYDRAMINE HCL 50 MG
25 CAPSULE ORAL EVERY 6 HOURS
Qty: 0 | Refills: 0 | Status: DISCONTINUED | OUTPATIENT
Start: 2018-03-20 | End: 2018-03-23

## 2018-03-20 RX ORDER — ONDANSETRON 8 MG/1
4 TABLET, FILM COATED ORAL EVERY 6 HOURS
Qty: 0 | Refills: 0 | Status: DISCONTINUED | OUTPATIENT
Start: 2018-03-20 | End: 2018-03-23

## 2018-03-20 RX ORDER — CITRIC ACID/SODIUM CITRATE 300-500 MG
30 SOLUTION, ORAL ORAL ONCE
Qty: 0 | Refills: 0 | Status: COMPLETED | OUTPATIENT
Start: 2018-03-20 | End: 2018-03-20

## 2018-03-20 RX ORDER — DIPHENHYDRAMINE HCL 50 MG
25 CAPSULE ORAL EVERY 4 HOURS
Qty: 0 | Refills: 0 | Status: DISCONTINUED | OUTPATIENT
Start: 2018-03-20 | End: 2018-03-23

## 2018-03-20 RX ORDER — ACETAMINOPHEN 500 MG
1000 TABLET ORAL ONCE
Qty: 0 | Refills: 0 | Status: DISCONTINUED | OUTPATIENT
Start: 2018-03-20 | End: 2018-03-23

## 2018-03-20 RX ORDER — SIMETHICONE 80 MG/1
80 TABLET, CHEWABLE ORAL EVERY 4 HOURS
Qty: 0 | Refills: 0 | Status: DISCONTINUED | OUTPATIENT
Start: 2018-03-20 | End: 2018-03-23

## 2018-03-20 RX ORDER — OXYCODONE AND ACETAMINOPHEN 5; 325 MG/1; MG/1
1 TABLET ORAL
Qty: 0 | Refills: 0 | Status: DISCONTINUED | OUTPATIENT
Start: 2018-03-20 | End: 2018-03-23

## 2018-03-20 RX ORDER — FERROUS SULFATE 325(65) MG
325 TABLET ORAL DAILY
Qty: 0 | Refills: 0 | Status: DISCONTINUED | OUTPATIENT
Start: 2018-03-20 | End: 2018-03-23

## 2018-03-20 RX ORDER — KETOROLAC TROMETHAMINE 30 MG/ML
30 SYRINGE (ML) INJECTION ONCE
Qty: 0 | Refills: 0 | Status: DISCONTINUED | OUTPATIENT
Start: 2018-03-20 | End: 2018-03-20

## 2018-03-20 RX ORDER — DIPHENHYDRAMINE HCL 50 MG
25 CAPSULE ORAL ONCE
Qty: 0 | Refills: 0 | Status: DISCONTINUED | OUTPATIENT
Start: 2018-03-20 | End: 2018-03-23

## 2018-03-20 RX ORDER — ACETAMINOPHEN 500 MG
650 TABLET ORAL EVERY 6 HOURS
Qty: 0 | Refills: 0 | Status: DISCONTINUED | OUTPATIENT
Start: 2018-03-20 | End: 2018-03-23

## 2018-03-20 RX ORDER — ONDANSETRON 8 MG/1
4 TABLET, FILM COATED ORAL ONCE
Qty: 0 | Refills: 0 | Status: DISCONTINUED | OUTPATIENT
Start: 2018-03-20 | End: 2018-03-20

## 2018-03-20 RX ORDER — METOCLOPRAMIDE HCL 10 MG
10 TABLET ORAL ONCE
Qty: 0 | Refills: 0 | Status: DISCONTINUED | OUTPATIENT
Start: 2018-03-20 | End: 2018-03-20

## 2018-03-20 RX ORDER — SODIUM CHLORIDE 9 MG/ML
1000 INJECTION, SOLUTION INTRAVENOUS ONCE
Qty: 0 | Refills: 0 | Status: COMPLETED | OUTPATIENT
Start: 2018-03-20 | End: 2018-03-20

## 2018-03-20 RX ORDER — DOCUSATE SODIUM 100 MG
100 CAPSULE ORAL
Qty: 0 | Refills: 0 | Status: DISCONTINUED | OUTPATIENT
Start: 2018-03-20 | End: 2018-03-23

## 2018-03-20 RX ORDER — OXYTOCIN 10 UNIT/ML
41.67 VIAL (ML) INJECTION
Qty: 20 | Refills: 0 | Status: DISCONTINUED | OUTPATIENT
Start: 2018-03-20 | End: 2018-03-20

## 2018-03-20 RX ORDER — OXYCODONE AND ACETAMINOPHEN 5; 325 MG/1; MG/1
2 TABLET ORAL EVERY 6 HOURS
Qty: 0 | Refills: 0 | Status: DISCONTINUED | OUTPATIENT
Start: 2018-03-20 | End: 2018-03-23

## 2018-03-20 RX ORDER — SODIUM CHLORIDE 9 MG/ML
1000 INJECTION, SOLUTION INTRAVENOUS
Qty: 0 | Refills: 0 | Status: DISCONTINUED | OUTPATIENT
Start: 2018-03-20 | End: 2018-03-23

## 2018-03-20 RX ADMIN — Medication 1000 MILLIUNIT(S)/MIN: at 14:28

## 2018-03-20 RX ADMIN — Medication 30 MILLILITER(S): at 10:26

## 2018-03-20 RX ADMIN — SODIUM CHLORIDE 125 MILLILITER(S): 9 INJECTION, SOLUTION INTRAVENOUS at 11:03

## 2018-03-20 RX ADMIN — Medication 125 MILLIUNIT(S)/MIN: at 14:46

## 2018-03-20 RX ADMIN — Medication 30 MILLIGRAM(S): at 16:59

## 2018-03-20 RX ADMIN — Medication 100 MILLIGRAM(S): at 11:25

## 2018-03-20 RX ADMIN — SODIUM CHLORIDE 2000 MILLILITER(S): 9 INJECTION, SOLUTION INTRAVENOUS at 10:15

## 2018-03-20 RX ADMIN — Medication 30 MILLIGRAM(S): at 17:14

## 2018-03-20 NOTE — DISCHARGE NOTE OB - PATIENT PORTAL LINK FT
You can access the [facility], offered by [facility], by registering with the following website: [email or web address]

## 2018-03-20 NOTE — DISCHARGE NOTE OB - MEDICATION SUMMARY - MEDICATIONS TO TAKE
I will START or STAY ON the medications listed below when I get home from the hospital:    Percocet 5/325 oral tablet  -- 2 tab(s) by mouth every 6 hours, As needed, Severe Pain MDD:4  -- Indication: For  delivery delivered    ibuprofen 600 mg oral tablet  -- 1 tab(s) by mouth every 6 hours, As needed, Mild pain or headache  -- Indication: For  delivery delivered    docusate sodium 100 mg oral capsule  -- 1 cap(s) by mouth 2 times a day, As needed, Stool Softening  -- Indication: For  delivery delivered

## 2018-03-20 NOTE — DISCHARGE NOTE OB - CARE PLAN
Principal Discharge DX:	 delivery delivered  Goal:	Rapid recovery  Assessment and plan of treatment:	follow up in 1 week for wound check and 6 weeks for postpartum care. Call OB if you have any concerns.

## 2018-03-20 NOTE — DISCHARGE NOTE OB - MATERIALS PROVIDED
Vaccinations/[facility] Hearing Screen Program/Discharge Medication Information for Patients and Families Pocket Guide/Tdap Vaccination (VIS Pub Date: 2012)/[facility] Welcome Screening Program/Breastfeeding Mother’s Support Group Information/Guide to Postpartum Care/Back To Sleep Handout/Shaken Baby Prevention Handout/Birth Certificate Instructions/MMR Vaccination (VIS Pub Date: 2012)/  Immunization Record/Breastfeeding Log/Breastfeeding Guide and Packet

## 2018-03-20 NOTE — DISCHARGE NOTE OB - PLAN OF CARE
Rapid recovery follow up in 1 week for wound check and 6 weeks for postpartum care. Call OB if you have any concerns.

## 2018-03-20 NOTE — DISCHARGE NOTE OB - HOSPITAL COURSE
She presented for a scheduled  section and bilateral salpingectomy. She had an uncomplicated surgery and postpartum course. She was discharged home in stable condition.

## 2018-03-21 LAB
BASOPHILS # BLD AUTO: 0 K/UL — SIGNIFICANT CHANGE UP (ref 0–0.2)
BASOPHILS NFR BLD AUTO: 0.2 % — SIGNIFICANT CHANGE UP (ref 0–2)
EOSINOPHIL # BLD AUTO: 0 K/UL — SIGNIFICANT CHANGE UP (ref 0–0.5)
EOSINOPHIL NFR BLD AUTO: 0.4 % — SIGNIFICANT CHANGE UP (ref 0–6)
HCT VFR BLD CALC: 34.1 % — LOW (ref 37–47)
HGB BLD-MCNC: 11.1 G/DL — LOW (ref 12–16)
LYMPHOCYTES # BLD AUTO: 1.9 K/UL — SIGNIFICANT CHANGE UP (ref 1–4.8)
LYMPHOCYTES # BLD AUTO: 21.4 % — SIGNIFICANT CHANGE UP (ref 20–55)
MCHC RBC-ENTMCNC: 30.6 PG — SIGNIFICANT CHANGE UP (ref 27–31)
MCHC RBC-ENTMCNC: 32.6 G/DL — SIGNIFICANT CHANGE UP (ref 32–36)
MCV RBC AUTO: 93.9 FL — SIGNIFICANT CHANGE UP (ref 81–99)
MONOCYTES # BLD AUTO: 0.5 K/UL — SIGNIFICANT CHANGE UP (ref 0–0.8)
MONOCYTES NFR BLD AUTO: 5.1 % — SIGNIFICANT CHANGE UP (ref 3–10)
NEUTROPHILS # BLD AUTO: 6.5 K/UL — SIGNIFICANT CHANGE UP (ref 1.8–8)
NEUTROPHILS NFR BLD AUTO: 72.7 % — SIGNIFICANT CHANGE UP (ref 37–73)
PLATELET # BLD AUTO: 180 K/UL — SIGNIFICANT CHANGE UP (ref 150–400)
RBC # BLD: 3.63 M/UL — LOW (ref 4.4–5.2)
RBC # FLD: 14.8 % — SIGNIFICANT CHANGE UP (ref 11–15.6)
T PALLIDUM AB TITR SER: NEGATIVE — SIGNIFICANT CHANGE UP
WBC # BLD: 8.9 K/UL — SIGNIFICANT CHANGE UP (ref 4.8–10.8)
WBC # FLD AUTO: 8.9 K/UL — SIGNIFICANT CHANGE UP (ref 4.8–10.8)

## 2018-03-21 RX ORDER — IBUPROFEN 200 MG
1 TABLET ORAL
Qty: 30 | Refills: 0
Start: 2018-03-21

## 2018-03-21 RX ORDER — DOCUSATE SODIUM 100 MG
1 CAPSULE ORAL
Qty: 28 | Refills: 0
Start: 2018-03-21 | End: 2018-04-03

## 2018-03-21 RX ADMIN — Medication 600 MILLIGRAM(S): at 08:16

## 2018-03-21 RX ADMIN — Medication 600 MILLIGRAM(S): at 08:46

## 2018-03-21 RX ADMIN — Medication 30 MILLIGRAM(S): at 01:25

## 2018-03-21 RX ADMIN — Medication 325 MILLIGRAM(S): at 13:08

## 2018-03-21 RX ADMIN — SIMETHICONE 80 MILLIGRAM(S): 80 TABLET, CHEWABLE ORAL at 13:09

## 2018-03-21 RX ADMIN — Medication 600 MILLIGRAM(S): at 18:29

## 2018-03-21 RX ADMIN — Medication 100 MILLIGRAM(S): at 13:08

## 2018-03-21 RX ADMIN — Medication 30 MILLIGRAM(S): at 01:10

## 2018-03-21 RX ADMIN — Medication 1 TABLET(S): at 13:08

## 2018-03-21 RX ADMIN — Medication 600 MILLIGRAM(S): at 17:33

## 2018-03-21 RX ADMIN — SIMETHICONE 80 MILLIGRAM(S): 80 TABLET, CHEWABLE ORAL at 08:16

## 2018-03-21 RX ADMIN — Medication 650 MILLIGRAM(S): at 18:30

## 2018-03-21 RX ADMIN — SIMETHICONE 80 MILLIGRAM(S): 80 TABLET, CHEWABLE ORAL at 17:33

## 2018-03-21 NOTE — PROGRESS NOTE ADULT - SUBJECTIVE AND OBJECTIVE BOX
Patient is a 31yo  now POD#1 s/p  section      S:    The patient has no complaints.  She is ambulating to bathroom, not in hallways yet. Tolerating diet   - flatus/-BM     O:    T(C): 36.8 (18 @ 06:09), Max: 37.1 (18 @ 01:17)  HR: 83 (18 @ 06:09) (68 - 90)  BP: 107/65 (18 @ 06:09) (98/51 - 123/77)  RR: 18 (18 @ 06:09) (16 - 20)  SpO2: 99% (18 @ 14:24) (98% - 100%)    Abdomen:  soft, non-tender, non-distended, +bowel sounds.  Uterus:  Fundus firm below umbilicus  Incision:  mild dry blood/intact  VE:  +lochia  Ext:  Non-tender.

## 2018-03-21 NOTE — PROGRESS NOTE ADULT - ASSESSMENT
A/P:  Patient is a 31yo  now POD#1 s/p  section   -Stable, steriostrips replaced   -Voiding, tolerating PO, bowel function nml   -Advance care as tolerated   -Continue routine postpartum/postoperative care and education.

## 2018-03-21 NOTE — PROGRESS NOTE ADULT - SUBJECTIVE AND OBJECTIVE BOX
Patient is post Caesarean Section with spinal anesthesia  OOB to chair and ambulation  No complaints including headache

## 2018-03-21 NOTE — PROGRESS NOTE ADULT - SUBJECTIVE AND OBJECTIVE BOX
Postoperative day #1 status post repeat  section of a FEMALE infant  O POSITIVE, Rubella Immune    S:  The patient has no complaints.    O:      T(C): 37.1 (18 @ 09:06), Max: 37.1 (18 @ 01:17)  HR: 88 (18 @ 09:06) (68 - 90)  BP: 103/64 (18 @ 09:06) (98/51 - 123/77)  RR: 18 (18 @ 09:06) (16 - 20)  SpO2: 99% (18 @ 14:24) (98% - 100%)    Hematocrit: 39.3 % --> Hematocrit: 34.1 %    Chest:  Clear to auscultation bilaterally.  Cardiovascular:  +s1, s2.  Abdomen:  Soft, non-tender, non-distended, +bowel sounds.  Uterus:  Fundus firm below umbilicus  Incision:  Clean, dry, intact with steri strips.  Vaginal exam:  +Lochia.  Extremity:  Non-tender.    A/P:  POD#1  -Stable  -Advance care.  -Continue routine postpartum/postoperative care and education.

## 2018-03-22 RX ORDER — ACETAMINOPHEN 500 MG
650 TABLET ORAL EVERY 6 HOURS
Qty: 0 | Refills: 0 | Status: COMPLETED | OUTPATIENT
Start: 2018-03-22 | End: 2018-03-22

## 2018-03-22 RX ADMIN — Medication 600 MILLIGRAM(S): at 10:22

## 2018-03-22 RX ADMIN — Medication 1 TABLET(S): at 12:57

## 2018-03-22 RX ADMIN — SIMETHICONE 80 MILLIGRAM(S): 80 TABLET, CHEWABLE ORAL at 18:18

## 2018-03-22 RX ADMIN — Medication 650 MILLIGRAM(S): at 13:00

## 2018-03-22 RX ADMIN — Medication 650 MILLIGRAM(S): at 23:37

## 2018-03-22 RX ADMIN — SIMETHICONE 80 MILLIGRAM(S): 80 TABLET, CHEWABLE ORAL at 09:53

## 2018-03-22 RX ADMIN — Medication 600 MILLIGRAM(S): at 18:18

## 2018-03-22 RX ADMIN — Medication 325 MILLIGRAM(S): at 12:57

## 2018-03-22 RX ADMIN — Medication 600 MILLIGRAM(S): at 09:52

## 2018-03-22 RX ADMIN — Medication 100 MILLIGRAM(S): at 18:18

## 2018-03-22 RX ADMIN — Medication 100 MILLIGRAM(S): at 09:53

## 2018-03-22 RX ADMIN — Medication 600 MILLIGRAM(S): at 18:41

## 2018-03-22 RX ADMIN — Medication 650 MILLIGRAM(S): at 03:36

## 2018-03-22 NOTE — PROGRESS NOTE ADULT - SUBJECTIVE AND OBJECTIVE BOX
She is a  32y   who is now post-operative day 2 from schedule repeat CS and bilateral salpingectomy    Subjective:  The patient feels well.  She is ambulating and tolerating a diet  She is having  + flatus + bowel movements.  She reports no breathing problems, headache or visual changes  She reports normal postpartum bleeding    Vital Signs Last 24 Hrs  T(C): 36.9 (21 Mar 2018 20:00), Max: 37.1 (21 Mar 2018 09:06)  T(F): 98.4 (21 Mar 2018 20:00), Max: 98.7 (21 Mar 2018 09:06)  HR: 81 (21 Mar 2018 20:00) (81 - 88)  BP: 101/65 (21 Mar 2018 20:00) (101/65 - 103/64)  BP(mean): --  RR: 18 (21 Mar 2018 20:00) (18 - 18)  SpO2: --    Physical exam:  She generally looks and feels well.  No increased work of breathing.  Abdomen: Soft, nontender, no distension , firm uterine fundus, the incision is clean dry and intact.  Pelvic: Normal lochia noted.  Ext: No DVT signs, warm extremities.    LABS:                        11.1   8.9   )-----------( 180      ( 21 Mar 2018 06:58 )             34.1         Allergies    No Known Allergies    Intolerances

## 2018-03-23 VITALS
TEMPERATURE: 98 F | DIASTOLIC BLOOD PRESSURE: 77 MMHG | HEART RATE: 74 BPM | RESPIRATION RATE: 18 BRPM | SYSTOLIC BLOOD PRESSURE: 125 MMHG

## 2018-03-23 PROCEDURE — 86780 TREPONEMA PALLIDUM: CPT

## 2018-03-23 PROCEDURE — 36415 COLL VENOUS BLD VENIPUNCTURE: CPT

## 2018-03-23 PROCEDURE — 59025 FETAL NON-STRESS TEST: CPT

## 2018-03-23 PROCEDURE — 59050 FETAL MONITOR W/REPORT: CPT

## 2018-03-23 PROCEDURE — G0463: CPT

## 2018-03-23 PROCEDURE — 88304 TISSUE EXAM BY PATHOLOGIST: CPT

## 2018-03-23 PROCEDURE — C1765: CPT

## 2018-03-23 PROCEDURE — 86900 BLOOD TYPING SEROLOGIC ABO: CPT

## 2018-03-23 PROCEDURE — 86850 RBC ANTIBODY SCREEN: CPT

## 2018-03-23 PROCEDURE — 85027 COMPLETE CBC AUTOMATED: CPT

## 2018-03-23 PROCEDURE — 88305 TISSUE EXAM BY PATHOLOGIST: CPT

## 2018-03-23 PROCEDURE — 86901 BLOOD TYPING SEROLOGIC RH(D): CPT

## 2018-03-23 PROCEDURE — 82803 BLOOD GASES ANY COMBINATION: CPT

## 2018-03-23 RX ADMIN — Medication 600 MILLIGRAM(S): at 05:13

## 2018-03-23 RX ADMIN — Medication 600 MILLIGRAM(S): at 06:07

## 2018-03-23 RX ADMIN — Medication 650 MILLIGRAM(S): at 00:30

## 2018-03-23 NOTE — PROGRESS NOTE ADULT - ASSESSMENT
A/P: Patient is a 31yo  now POD#3 s/p  section    -Stable  -Voiding, tolerating PO, bowel function nml   -Advance care as tolerated   -Continue routine postpartum/postoperative care and education.

## 2018-03-23 NOTE — PROGRESS NOTE ADULT - SUBJECTIVE AND OBJECTIVE BOX
Patient is a 33yo  now POD#3 s/p  section      S:    The patient has no complaints.  She is ambulating and tolerating diet   + flatus/-BM     O:    T(C): 36.7 (18 @ 04:39), Max: 36.7 (18 @ 09:47)  HR: 59 (18 @ 04:39) (59 - 91)  BP: 114/80 (18 @ 04:39) (107/74 - 114/80)  RR: 18 (18 @ 04:39) (18 - 18)    Abdomen:  soft, non-tender, non-distended, +bowel sounds.  Uterus:  Fundus firm below umbilicus  Incision:  Clean/dry/intact  VE:  +lochia  Ext:  Non-tender.                          11.1   8.9   )-----------( 180      ( 21 Mar 2018 06:58 )             34.1

## 2018-08-16 ENCOUNTER — RESULT REVIEW (OUTPATIENT)
Age: 33
End: 2018-08-16

## 2019-03-05 ENCOUNTER — APPOINTMENT (OUTPATIENT)
Dept: OBGYN | Facility: CLINIC | Age: 34
End: 2019-03-05
Payer: COMMERCIAL

## 2019-03-05 ENCOUNTER — RESULT CHARGE (OUTPATIENT)
Age: 34
End: 2019-03-05

## 2019-03-05 VITALS
HEIGHT: 58 IN | WEIGHT: 143 LBS | SYSTOLIC BLOOD PRESSURE: 112 MMHG | DIASTOLIC BLOOD PRESSURE: 70 MMHG | BODY MASS INDEX: 30.02 KG/M2

## 2019-03-05 LAB
BILIRUB UR QL STRIP: NORMAL
COLLECTION METHOD: NORMAL
GLUCOSE UR-MCNC: NORMAL
HCG UR QL: 0.2 EU/DL
HCG UR QL: NEGATIVE
HGB UR QL STRIP.AUTO: NORMAL
KETONES UR-MCNC: NORMAL
LEUKOCYTE ESTERASE UR QL STRIP: NORMAL
NITRITE UR QL STRIP: NORMAL
PH UR STRIP: 6.5
PROT UR STRIP-MCNC: NORMAL
QUALITY CONTROL: YES
SP GR UR STRIP: 1.01

## 2019-03-05 PROCEDURE — 81003 URINALYSIS AUTO W/O SCOPE: CPT | Mod: QW

## 2019-03-05 PROCEDURE — 99213 OFFICE O/P EST LOW 20 MIN: CPT

## 2019-03-05 PROCEDURE — 81025 URINE PREGNANCY TEST: CPT

## 2019-03-05 NOTE — HISTORY OF PRESENT ILLNESS
[Last Pap ___] : Last cervical pap smear was [unfilled] [Reproductive Age] : is of reproductive age [Menstrual Problems] : reports abnormal menses [Contraception] : uses contraception [Pregnancy History] : pregnancy history: [Definite:  ___ (Date)] : the last menstrual period was [unfilled] [Normal Amount/Duration] : was of a normal amount and duration [Regular Cycle Intervals] : periods have been regular [Menarche Age: ____] : age at menarche was [unfilled] [Sexually Active] : is sexually active [Monogamous] : is monogamous [Male ___] : [unfilled] male

## 2019-03-05 NOTE — PHYSICAL EXAM
[Normal] : uterus [Discharge] : a  ~M vaginal discharge was present [Moderate] : moderate [Alli] : yellow [Watery] : watery [Motion Tenderness] : there was no cervical motion tenderness [Tenderness] : nontender [Enlarged ___ wks] : not enlarged [Mass ___ cm] : no uterine mass was palpated [Uterine Adnexae] : were not tender and not enlarged [Adnexa Tenderness] : were not tender [Ovarian Mass (___ Cm)] : there were no adnexal masses

## 2019-03-07 LAB
C TRACH RRNA SPEC QL NAA+PROBE: NOT DETECTED
CANDIDA VAG CYTO: NOT DETECTED
G VAGINALIS+PREV SP MTYP VAG QL MICRO: DETECTED
N GONORRHOEA RRNA SPEC QL NAA+PROBE: NOT DETECTED
SOURCE AMPLIFICATION: NORMAL
T VAGINALIS VAG QL WET PREP: NOT DETECTED

## 2019-05-20 NOTE — DISCHARGE NOTE OB - CARE PROVIDER_API CALL
Have You Had Botox Before?: has had botox Johanna Palacio), Obstetrics and Gynecology  [address] [place] [address]  Phone: [phone]  Fax: [phone]

## 2019-08-21 ENCOUNTER — RECORD ABSTRACTING (OUTPATIENT)
Age: 34
End: 2019-08-21

## 2019-08-21 DIAGNOSIS — Z92.89 PERSONAL HISTORY OF OTHER MEDICAL TREATMENT: ICD-10-CM

## 2019-08-21 DIAGNOSIS — Z87.42 PERSONAL HISTORY OF OTHER DISEASES OF THE FEMALE GENITAL TRACT: ICD-10-CM

## 2019-08-21 DIAGNOSIS — Z82.49 FAMILY HISTORY OF ISCHEMIC HEART DISEASE AND OTHER DISEASES OF THE CIRCULATORY SYSTEM: ICD-10-CM

## 2019-08-21 LAB — CYTOLOGY CVX/VAG DOC THIN PREP: NORMAL

## 2019-08-22 ENCOUNTER — APPOINTMENT (OUTPATIENT)
Dept: OBGYN | Facility: CLINIC | Age: 34
End: 2019-08-22
Payer: COMMERCIAL

## 2019-08-22 ENCOUNTER — RESULT CHARGE (OUTPATIENT)
Age: 34
End: 2019-08-22

## 2019-08-22 VITALS
BODY MASS INDEX: 31.49 KG/M2 | WEIGHT: 150 LBS | DIASTOLIC BLOOD PRESSURE: 66 MMHG | SYSTOLIC BLOOD PRESSURE: 108 MMHG | HEIGHT: 58 IN

## 2019-08-22 DIAGNOSIS — O99.213 OBESITY COMPLICATING PREGNANCY, THIRD TRIMESTER: ICD-10-CM

## 2019-08-22 DIAGNOSIS — O24.410 GESTATIONAL DIABETES MELLITUS IN PREGNANCY, DIET CONTROLLED: ICD-10-CM

## 2019-08-22 LAB
BILIRUB UR QL STRIP: NORMAL
CLARITY UR: CLEAR
COLLECTION METHOD: NORMAL
GLUCOSE UR-MCNC: NORMAL
HCG UR QL: 0.2 EU/DL
HCG UR QL: NEGATIVE
HGB UR QL STRIP.AUTO: NORMAL
KETONES UR-MCNC: NORMAL
LEUKOCYTE ESTERASE UR QL STRIP: NORMAL
NITRITE UR QL STRIP: NORMAL
PH UR STRIP: 6
PROT UR STRIP-MCNC: NORMAL
QUALITY CONTROL: YES
SP GR UR STRIP: 1.02

## 2019-08-22 PROCEDURE — 99395 PREV VISIT EST AGE 18-39: CPT

## 2019-08-22 PROCEDURE — 81003 URINALYSIS AUTO W/O SCOPE: CPT | Mod: QW

## 2019-08-22 RX ORDER — TERCONAZOLE 4 MG/G
0.4 CREAM VAGINAL
Qty: 1 | Refills: 0 | Status: COMPLETED | COMMUNITY
Start: 2019-03-05 | End: 2019-08-22

## 2019-08-22 RX ORDER — LANCETS 33 GAUGE
EACH MISCELLANEOUS
Qty: 2 | Refills: 2 | Status: COMPLETED | COMMUNITY
Start: 2018-02-15 | End: 2019-08-22

## 2019-08-22 RX ORDER — METRONIDAZOLE 7.5 MG/G
0.75 GEL VAGINAL
Qty: 1 | Refills: 0 | Status: COMPLETED | COMMUNITY
Start: 2019-03-05 | End: 2019-08-22

## 2019-08-22 RX ORDER — BLOOD SUGAR DIAGNOSTIC
STRIP MISCELLANEOUS
Qty: 200 | Refills: 3 | Status: COMPLETED | COMMUNITY
Start: 2018-02-15 | End: 2019-08-22

## 2019-08-22 RX ORDER — URINE ACETONE TEST STRIPS
STRIP MISCELLANEOUS
Qty: 2 | Refills: 1 | Status: COMPLETED | COMMUNITY
Start: 2018-02-15 | End: 2019-08-22

## 2019-08-22 NOTE — HISTORY OF PRESENT ILLNESS
[1 Year Ago] : 1 year ago [Good] : being in good health [Last Pap ___] : Last cervical pap smear was [unfilled] [Reproductive Age] : is of reproductive age [Pregnancy History] : pregnancy history: [Definite:  ___ (Date)] : the last menstrual period was [unfilled] [Sexually Active] : is sexually active [Menarche Age: ____] : age at menarche was [unfilled] [No] : no [Male ___] : [unfilled] male [Menstrual Problems] : reports normal menses [Contraception] : does not use contraception

## 2019-08-23 LAB — HPV HIGH+LOW RISK DNA PNL CVX: NOT DETECTED

## 2019-08-27 LAB — CYTOLOGY CVX/VAG DOC THIN PREP: ABNORMAL

## 2019-10-09 ENCOUNTER — APPOINTMENT (OUTPATIENT)
Dept: OBGYN | Facility: CLINIC | Age: 34
End: 2019-10-09
Payer: COMMERCIAL

## 2019-10-09 VITALS
SYSTOLIC BLOOD PRESSURE: 122 MMHG | DIASTOLIC BLOOD PRESSURE: 82 MMHG | WEIGHT: 153 LBS | BODY MASS INDEX: 32.12 KG/M2 | HEIGHT: 58 IN

## 2019-10-09 PROCEDURE — 99213 OFFICE O/P EST LOW 20 MIN: CPT

## 2019-10-09 NOTE — HISTORY OF PRESENT ILLNESS
[Last Pap ___] : Last cervical pap smear was [unfilled] [Reproductive Age] : is of reproductive age [Definite:  ___ (Date)] : the last menstrual period was [unfilled] [Menarche Age: ____] : age at menarche was [unfilled] [Sexually Active] : is sexually active [Male ___] : [unfilled] male

## 2019-10-10 ENCOUNTER — RESULT REVIEW (OUTPATIENT)
Age: 34
End: 2019-10-10

## 2019-10-10 LAB
CANDIDA VAG CYTO: NOT DETECTED
G VAGINALIS+PREV SP MTYP VAG QL MICRO: DETECTED
T VAGINALIS VAG QL WET PREP: NOT DETECTED

## 2019-11-07 ENCOUNTER — APPOINTMENT (OUTPATIENT)
Dept: OBGYN | Facility: CLINIC | Age: 34
End: 2019-11-07
Payer: COMMERCIAL

## 2019-11-07 VITALS
SYSTOLIC BLOOD PRESSURE: 112 MMHG | BODY MASS INDEX: 32.12 KG/M2 | WEIGHT: 153 LBS | HEIGHT: 58 IN | DIASTOLIC BLOOD PRESSURE: 80 MMHG

## 2019-11-07 PROCEDURE — 99213 OFFICE O/P EST LOW 20 MIN: CPT

## 2019-11-07 NOTE — DISCUSSION/SUMMARY
[FreeTextEntry1] : treatment for yeast initiated.  she requests terazol.\par \par we discussed preventative measures including eliminating added sugars in her diet.\par \par she will keep track of her chronic abdominal pain and monitor for contributing factors.\par \par She is recommended to return for a pelvic US after her next menses.  we also discussed the possibility that the pain is not gynecological given her normal exam at this time.

## 2019-11-07 NOTE — PHYSICAL EXAM
[Awake] : awake [Alert] : alert [Tender] : non tender [Soft] : soft [Distended] : not distended [Oriented x3] : oriented to person, place, and time [Normal] : cervix [Moderate] : moderate [Discharge] : a  ~M vaginal discharge was present [Watery] : watery [White] : white [Tenderness] : nontender [Motion Tenderness] : there was no cervical motion tenderness [Enlarged ___ wks] : not enlarged [Mass ___ cm] : no uterine mass was palpated [Uterine Adnexae] : were not tender and not enlarged

## 2019-11-07 NOTE — HISTORY OF PRESENT ILLNESS
[___ Month(s) Ago] : [unfilled] month(s) ago [Last Pap ___] : Last cervical pap smear was [unfilled] [Reproductive Age] : is of reproductive age [Pregnancy History] : pregnancy history: [Menarche Age: ____] : age at menarche was [unfilled] [Sexually Active] : is sexually active [Monogamous] : is monogamous [No] : no [Male ___] : [unfilled] male [Definite:  ___ (Date)] : the last menstrual period was [unfilled]

## 2019-12-19 ENCOUNTER — APPOINTMENT (OUTPATIENT)
Dept: OBGYN | Facility: CLINIC | Age: 34
End: 2019-12-19
Payer: COMMERCIAL

## 2019-12-19 ENCOUNTER — ASOB RESULT (OUTPATIENT)
Age: 34
End: 2019-12-19

## 2019-12-19 VITALS
WEIGHT: 154 LBS | BODY MASS INDEX: 32.32 KG/M2 | SYSTOLIC BLOOD PRESSURE: 118 MMHG | HEIGHT: 58 IN | DIASTOLIC BLOOD PRESSURE: 70 MMHG

## 2019-12-19 DIAGNOSIS — Z87.42 PERSONAL HISTORY OF OTHER DISEASES OF THE FEMALE GENITAL TRACT: ICD-10-CM

## 2019-12-19 PROCEDURE — 99213 OFFICE O/P EST LOW 20 MIN: CPT | Mod: 25

## 2019-12-19 PROCEDURE — 76830 TRANSVAGINAL US NON-OB: CPT

## 2019-12-19 RX ORDER — METRONIDAZOLE 500 MG/1
500 TABLET ORAL TWICE DAILY
Qty: 14 | Refills: 0 | Status: DISCONTINUED | COMMUNITY
Start: 2019-11-14 | End: 2019-12-19

## 2019-12-19 RX ORDER — TERCONAZOLE 4 MG/G
0.4 CREAM VAGINAL
Qty: 1 | Refills: 1 | Status: DISCONTINUED | COMMUNITY
Start: 2019-11-07 | End: 2019-12-19

## 2019-12-19 NOTE — HISTORY OF PRESENT ILLNESS
[Last Pap Smear ___] : last Papanicolaou cytology done [unfilled] [Dull] : dull [Menarche Age: ____] : age at menarche was [unfilled] [Definite:  ___ (Date)] : the last menstrual period was [unfilled] [Tubal Occlusion] : has had a tubal occlusion [Pregnancy History] : pregnancy history: [Total Preg ___] : [unfilled] [Full Term ___] : [unfilled] [Living ___] : [unfilled] [Monogamous (Male Partner)] : is monogamous with a male partner [Sexually Active] : is sexually active [Contraception] : uses contraception [Monogamous] : is monogamous [Tubal Ligation] : has had a tubal ligation [Male ___] : [unfilled] male [de-identified] : Pelvic U/S 12/19/19 [Menstrual Problems] : reports normal menses [Continuous] : no continuous [Burning] : no burning [Sharp] : no sharp [Stabbing] : no stabbing [Throbbing] : no throbbing

## 2019-12-19 NOTE — BEGINNING OF VISIT
Telephone Encounter by Dixie Iqbal at 10/16/17 01:06 PM     Author:  Dixie Iqbal Service:  (none) Author Type:       Filed:  10/16/17 01:07 PM Encounter Date:  10/16/2017 Status:  Signed     :  Dixie Iqbal ()            Pt returned phone call to schedule colonoscopy.   Please call back.[PF1.1M]        Revision History        User Key Date/Time User Provider Type Action    > PF1.1 10/16/17 01:07 PM Dixie Iqbal  Sign    M - Manual             [Patient] : patient

## 2019-12-19 NOTE — DISCUSSION/SUMMARY
[FreeTextEntry1] : we reviewed her pelvic US today showing a small 1.6cm complex right ovarian cyst which is unlikely the source of her pain, however we will repeat the sonogram in 3 mos for surveillance.\par \par In the meantime, she is recommended to see her pcp to r/o alternative etiology of her pain.\par \par She denies acute pain today, but states she is feeling some dull intermittent aching across her lower abdomen.  \par \par

## 2020-03-12 ENCOUNTER — APPOINTMENT (OUTPATIENT)
Dept: OBGYN | Facility: CLINIC | Age: 35
End: 2020-03-12

## 2020-03-19 ENCOUNTER — ASOB RESULT (OUTPATIENT)
Age: 35
End: 2020-03-19

## 2020-03-19 ENCOUNTER — APPOINTMENT (OUTPATIENT)
Dept: ANTEPARTUM | Facility: CLINIC | Age: 35
End: 2020-03-19
Payer: COMMERCIAL

## 2020-03-19 PROCEDURE — 76857 US EXAM PELVIC LIMITED: CPT

## 2020-09-15 ENCOUNTER — LABORATORY RESULT (OUTPATIENT)
Age: 35
End: 2020-09-15

## 2020-09-15 ENCOUNTER — APPOINTMENT (OUTPATIENT)
Dept: OBGYN | Facility: CLINIC | Age: 35
End: 2020-09-15
Payer: COMMERCIAL

## 2020-09-15 VITALS
DIASTOLIC BLOOD PRESSURE: 74 MMHG | HEIGHT: 58 IN | WEIGHT: 159 LBS | BODY MASS INDEX: 33.37 KG/M2 | SYSTOLIC BLOOD PRESSURE: 110 MMHG

## 2020-09-15 PROCEDURE — 99395 PREV VISIT EST AGE 18-39: CPT

## 2020-09-15 NOTE — PLAN
[FreeTextEntry1] : annual gyn exam\par -normal exam\par -discussed ASccp \par -pap w/ hpv collected\par \par hx of right complex ovarian cyst\par -previously small\par -recommend repeat pelvic US \par \par Vaginitis\par -no sx today\par -affirm sent\par \par rto 1 mos f/u US

## 2020-09-15 NOTE — HISTORY OF PRESENT ILLNESS
[N] : Patient does not use contraception [Y] : Patient is sexually active [Monogamous (Male Partner)] : is monogamous with a male partner [Menarche Age: ____] : age at menarche was [unfilled] [Currently Active] : currently active [Men] : men [PapSmeardate] : 8/22/19 [ChlamydiaDate] : 3/5/19 [TextBox_68] : neg [LMPDate] : 9/5/20 [PGHxTotal] : 2 [[facility] 2 [FreeTextEntry1] : 9/5/20

## 2020-09-15 NOTE — PHYSICAL EXAM
[Appropriately responsive] : appropriately responsive [Alert] : alert [No Acute Distress] : no acute distress [Regular Rate Rhythm] : regular rate rhythm [No Lymphadenopathy] : no lymphadenopathy [No Murmurs] : no murmurs [Clear to Auscultation B/L] : clear to auscultation bilaterally [Soft] : soft [Non-tender] : non-tender [No HSM] : No HSM [Non-distended] : non-distended [No Lesions] : no lesions [No Mass] : no mass [Oriented x3] : oriented x3 [Examination Of The Breasts] : a normal appearance [No Masses] : no breast masses were palpable [Labia Majora] : normal [Labia Minora] : normal [Discharge] : a  ~M vaginal discharge was present [Foul Smelling] : not foul smelling [Scant] : scant [White] : white [No Bleeding] : There was no active vaginal bleeding [Normal] : normal [Uterine Adnexae] : normal

## 2020-09-26 LAB
CYTOLOGY CVX/VAG DOC THIN PREP: ABNORMAL
HPV HIGH+LOW RISK DNA PNL CVX: DETECTED

## 2020-10-15 ENCOUNTER — ASOB RESULT (OUTPATIENT)
Age: 35
End: 2020-10-15

## 2020-10-15 ENCOUNTER — APPOINTMENT (OUTPATIENT)
Dept: OBGYN | Facility: CLINIC | Age: 35
End: 2020-10-15
Payer: COMMERCIAL

## 2020-10-15 VITALS
HEIGHT: 58 IN | BODY MASS INDEX: 33.37 KG/M2 | SYSTOLIC BLOOD PRESSURE: 112 MMHG | WEIGHT: 159 LBS | TEMPERATURE: 97.3 F | DIASTOLIC BLOOD PRESSURE: 74 MMHG

## 2020-10-15 LAB
BILIRUB UR QL STRIP: NORMAL
GLUCOSE UR-MCNC: NORMAL
HCG UR QL: 0.2 EU/DL
HGB UR QL STRIP.AUTO: NORMAL
KETONES UR-MCNC: NORMAL
LEUKOCYTE ESTERASE UR QL STRIP: NORMAL
NITRITE UR QL STRIP: NORMAL
PH UR STRIP: 6
PROT UR STRIP-MCNC: NORMAL
SP GR UR STRIP: 1.01

## 2020-10-15 PROCEDURE — 99214 OFFICE O/P EST MOD 30 MIN: CPT | Mod: 25

## 2020-10-15 PROCEDURE — 81003 URINALYSIS AUTO W/O SCOPE: CPT | Mod: QW

## 2020-10-15 PROCEDURE — 76830 TRANSVAGINAL US NON-OB: CPT

## 2020-10-15 RX ORDER — METRONIDAZOLE 7.5 MG/G
0.75 GEL VAGINAL
Qty: 1 | Refills: 0 | Status: DISCONTINUED | COMMUNITY
Start: 2020-09-16 | End: 2020-10-15

## 2020-10-16 NOTE — HISTORY OF PRESENT ILLNESS
[Patient reported PAP Smear was abnormal] : Patient reported PAP Smear was abnormal [N] : Patient does not use contraception [Monogamous (Male Partner)] : is monogamous with a male partner [Y] : Positive pregnancy history [Menarche Age: ____] : age at menarche was [unfilled] [Vaginal] : vaginal [Men] : men [Currently Active] : currently active [No] : No [Patient refuses STI testing] : Patient refuses STI testing [Papeardate] : 09/15/20 [TextBox_31] : neg pap, +HRHPV, neg HPV 16/18 [LMPDate] : 10/05/2020 [[facility]] : 2 [PGHxTotal] : 2 [FreeTextEntry1] : 10/05/2020

## 2020-10-16 NOTE — PHYSICAL EXAM
[Appropriately responsive] : appropriately responsive [Alert] : alert [Oriented x3] : oriented x3 [No Acute Distress] : no acute distress

## 2020-11-04 ENCOUNTER — APPOINTMENT (OUTPATIENT)
Dept: OBGYN | Facility: CLINIC | Age: 35
End: 2020-11-04

## 2020-11-05 ENCOUNTER — APPOINTMENT (OUTPATIENT)
Dept: OBGYN | Facility: CLINIC | Age: 35
End: 2020-11-05
Payer: COMMERCIAL

## 2020-11-05 VITALS
DIASTOLIC BLOOD PRESSURE: 64 MMHG | WEIGHT: 159 LBS | BODY MASS INDEX: 33.37 KG/M2 | SYSTOLIC BLOOD PRESSURE: 110 MMHG | HEIGHT: 58 IN

## 2020-11-05 PROCEDURE — 99214 OFFICE O/P EST MOD 30 MIN: CPT

## 2020-11-05 PROCEDURE — 99072 ADDL SUPL MATRL&STAF TM PHE: CPT

## 2020-11-05 RX ORDER — TERCONAZOLE 8 MG/G
0.8 CREAM VAGINAL
Qty: 1 | Refills: 0 | Status: COMPLETED | COMMUNITY
Start: 2020-10-15 | End: 2020-11-05

## 2020-11-05 NOTE — DISCUSSION/SUMMARY
[FreeTextEntry1] : 34 y/o\par vaginitis\par -affirm\par -diflucan script\par \par ingrown hair\par -bactrim DS x 7 days\par \par Endometrial polyps\par -recommend hysteroscopy D+C, myosure if available\par -spoke with surgical martinez; aware of task and making arrangements\par -patient to call next week if she has not heard from our office\par \par Dr. De

## 2020-11-05 NOTE — HISTORY OF PRESENT ILLNESS
[Patient reported PAP Smear was abnormal] : Patient reported PAP Smear was abnormal [N] : Patient does not use contraception [Y] : Positive pregnancy history [Menarche Age: ____] : age at menarche was [unfilled] [PapSmeardate] : 9/15/20 [TextBox_31] : cellular changes- hpv pos [LMPDate] : 10/31/20 [PGHxTotal] : 2 [[facility]] : 2 [[facility]] : 2 [FreeTextEntry1] : 10/31/20

## 2020-11-05 NOTE — PHYSICAL EXAM
[Appropriately responsive] : appropriately responsive [Alert] : alert [No Acute Distress] : no acute distress [Soft] : soft [Non-tender] : non-tender [No Lesions] : no lesions [Oriented x3] : oriented x3 [Labia Majora] : normal [Labia Minora] : normal [Discharge] : a  ~M vaginal discharge was present [Moderate] : moderate [White] : white [Cheesy] : cheesy [Normal] : normal [Uterine Adnexae] : normal [FreeTextEntry1] : right side of mons: 1 cm  in grown hair, no amenable to I+D, non-toxic

## 2020-11-12 ENCOUNTER — OUTPATIENT (OUTPATIENT)
Dept: OUTPATIENT SERVICES | Facility: HOSPITAL | Age: 35
LOS: 1 days | End: 2020-11-12
Payer: COMMERCIAL

## 2020-11-12 VITALS
HEIGHT: 58 IN | RESPIRATION RATE: 18 BRPM | WEIGHT: 156.53 LBS | SYSTOLIC BLOOD PRESSURE: 106 MMHG | HEART RATE: 68 BPM | TEMPERATURE: 97 F | DIASTOLIC BLOOD PRESSURE: 75 MMHG | OXYGEN SATURATION: 98 %

## 2020-11-12 DIAGNOSIS — Z98.891 HISTORY OF UTERINE SCAR FROM PREVIOUS SURGERY: Chronic | ICD-10-CM

## 2020-11-12 DIAGNOSIS — Z01.818 ENCOUNTER FOR OTHER PREPROCEDURAL EXAMINATION: ICD-10-CM

## 2020-11-12 DIAGNOSIS — N84.0 POLYP OF CORPUS UTERI: ICD-10-CM

## 2020-11-12 DIAGNOSIS — Z98.51 TUBAL LIGATION STATUS: Chronic | ICD-10-CM

## 2020-11-12 DIAGNOSIS — N93.9 ABNORMAL UTERINE AND VAGINAL BLEEDING, UNSPECIFIED: ICD-10-CM

## 2020-11-12 LAB
ANION GAP SERPL CALC-SCNC: 7 MMOL/L — SIGNIFICANT CHANGE UP (ref 5–17)
APPEARANCE UR: CLEAR — SIGNIFICANT CHANGE UP
BASOPHILS # BLD AUTO: 0 K/UL — SIGNIFICANT CHANGE UP (ref 0–0.2)
BASOPHILS NFR BLD AUTO: 0 % — SIGNIFICANT CHANGE UP (ref 0–2)
BILIRUB UR-MCNC: NEGATIVE — SIGNIFICANT CHANGE UP
BUN SERPL-MCNC: 15 MG/DL — SIGNIFICANT CHANGE UP (ref 7–23)
CALCIUM SERPL-MCNC: 9.1 MG/DL — SIGNIFICANT CHANGE UP (ref 8.5–10.1)
CHLORIDE SERPL-SCNC: 107 MMOL/L — SIGNIFICANT CHANGE UP (ref 96–108)
CO2 SERPL-SCNC: 24 MMOL/L — SIGNIFICANT CHANGE UP (ref 22–31)
COLOR SPEC: YELLOW — SIGNIFICANT CHANGE UP
CREAT SERPL-MCNC: 0.84 MG/DL — SIGNIFICANT CHANGE UP (ref 0.5–1.3)
DIFF PNL FLD: NEGATIVE — SIGNIFICANT CHANGE UP
EOSINOPHIL # BLD AUTO: 0.07 K/UL — SIGNIFICANT CHANGE UP (ref 0–0.5)
EOSINOPHIL NFR BLD AUTO: 2 % — SIGNIFICANT CHANGE UP (ref 0–6)
GLUCOSE SERPL-MCNC: 83 MG/DL — SIGNIFICANT CHANGE UP (ref 70–99)
GLUCOSE UR QL: NEGATIVE MG/DL — SIGNIFICANT CHANGE UP
HCT VFR BLD CALC: 39.6 % — SIGNIFICANT CHANGE UP (ref 34.5–45)
HGB BLD-MCNC: 13.5 G/DL — SIGNIFICANT CHANGE UP (ref 11.5–15.5)
KETONES UR-MCNC: NEGATIVE — SIGNIFICANT CHANGE UP
LEUKOCYTE ESTERASE UR-ACNC: NEGATIVE — SIGNIFICANT CHANGE UP
LYMPHOCYTES # BLD AUTO: 1.34 K/UL — SIGNIFICANT CHANGE UP (ref 1–3.3)
LYMPHOCYTES # BLD AUTO: 41 % — SIGNIFICANT CHANGE UP (ref 13–44)
MCHC RBC-ENTMCNC: 31.4 PG — SIGNIFICANT CHANGE UP (ref 27–34)
MCHC RBC-ENTMCNC: 34.1 GM/DL — SIGNIFICANT CHANGE UP (ref 32–36)
MCV RBC AUTO: 92.1 FL — SIGNIFICANT CHANGE UP (ref 80–100)
MONOCYTES # BLD AUTO: 0.46 K/UL — SIGNIFICANT CHANGE UP (ref 0–0.9)
MONOCYTES NFR BLD AUTO: 14 % — SIGNIFICANT CHANGE UP (ref 2–14)
NEUTROPHILS # BLD AUTO: 1.4 K/UL — LOW (ref 1.8–7.4)
NEUTROPHILS NFR BLD AUTO: 43 % — SIGNIFICANT CHANGE UP (ref 43–77)
NITRITE UR-MCNC: NEGATIVE — SIGNIFICANT CHANGE UP
NRBC # BLD: SIGNIFICANT CHANGE UP /100 WBCS (ref 0–0)
PH UR: 6 — SIGNIFICANT CHANGE UP (ref 5–8)
PLATELET # BLD AUTO: 227 K/UL — SIGNIFICANT CHANGE UP (ref 150–400)
POTASSIUM SERPL-MCNC: 3.9 MMOL/L — SIGNIFICANT CHANGE UP (ref 3.5–5.3)
POTASSIUM SERPL-SCNC: 3.9 MMOL/L — SIGNIFICANT CHANGE UP (ref 3.5–5.3)
PROT UR-MCNC: NEGATIVE MG/DL — SIGNIFICANT CHANGE UP
RBC # BLD: 4.3 M/UL — SIGNIFICANT CHANGE UP (ref 3.8–5.2)
RBC # FLD: 12 % — SIGNIFICANT CHANGE UP (ref 10.3–14.5)
SODIUM SERPL-SCNC: 138 MMOL/L — SIGNIFICANT CHANGE UP (ref 135–145)
SP GR SPEC: 1 — LOW (ref 1.01–1.02)
UROBILINOGEN FLD QL: NEGATIVE MG/DL — SIGNIFICANT CHANGE UP
WBC # BLD: 3.26 K/UL — LOW (ref 3.8–10.5)
WBC # FLD AUTO: 3.26 K/UL — LOW (ref 3.8–10.5)

## 2020-11-12 PROCEDURE — 86850 RBC ANTIBODY SCREEN: CPT

## 2020-11-12 PROCEDURE — 85025 COMPLETE CBC W/AUTO DIFF WBC: CPT

## 2020-11-12 PROCEDURE — G0463: CPT | Mod: 25

## 2020-11-12 PROCEDURE — 81003 URINALYSIS AUTO W/O SCOPE: CPT

## 2020-11-12 PROCEDURE — 86900 BLOOD TYPING SEROLOGIC ABO: CPT

## 2020-11-12 PROCEDURE — 36415 COLL VENOUS BLD VENIPUNCTURE: CPT

## 2020-11-12 PROCEDURE — 86901 BLOOD TYPING SEROLOGIC RH(D): CPT

## 2020-11-12 PROCEDURE — 80048 BASIC METABOLIC PNL TOTAL CA: CPT

## 2020-11-12 NOTE — H&P PST ADULT - ASSESSMENT
35 year old woman presents to PST for preprocedure exam. Patient is for planned hysteroscopy and polypectomy with possible myosure and all related procedures with Dr De.      Plan:  - PST instructions given ; NPO status instructions to be given by ASU .  - Pt instructed to take following meds with sip of water : no meds  - Pt instructed to take routine evening medications unless indicated .  -  Stop NSAIDS ( Aspirin Alev Motrin Mobic Diclofenac), herbal supplements , MVI , Vitamin fish oil 7 days prior to surgery  unless   directed by surgeon or cardiologist;   - Labs  as per surgeon request.   -  Pt instructed to self quarantine .  - Covid Testing scheduled on ____11/16_____.  Pt notified and aware.  - Pt denies covid symptoms shortness of breath fever cough .

## 2020-11-12 NOTE — H&P PST ADULT - NSANTHOSAYNRD_GEN_A_CORE
No. MARIA DEL CARMEN screening performed.  STOP BANG Legend: 0-2 = LOW Risk; 3-4 = INTERMEDIATE Risk; 5-8 = HIGH Risk

## 2020-11-12 NOTE — H&P PST ADULT - HISTORY OF PRESENT ILLNESS
35 year old woman presents to [facility] for preprocedure exam. Patient is for planned hysteroscopy and polypectomy with Dr De. Patient complains of abnormal uterine bleeding and prolonged periods. She ws also told that the lining of her uterus was thick and she will need a D and C.

## 2020-11-13 DIAGNOSIS — Z01.818 ENCOUNTER FOR OTHER PREPROCEDURAL EXAMINATION: ICD-10-CM

## 2020-11-13 DIAGNOSIS — N84.0 POLYP OF CORPUS UTERI: ICD-10-CM

## 2020-11-16 ENCOUNTER — OUTPATIENT (OUTPATIENT)
Dept: OUTPATIENT SERVICES | Facility: HOSPITAL | Age: 35
LOS: 1 days | End: 2020-11-16
Payer: COMMERCIAL

## 2020-11-16 DIAGNOSIS — Z98.891 HISTORY OF UTERINE SCAR FROM PREVIOUS SURGERY: Chronic | ICD-10-CM

## 2020-11-16 DIAGNOSIS — Z11.59 ENCOUNTER FOR SCREENING FOR OTHER VIRAL DISEASES: ICD-10-CM

## 2020-11-16 DIAGNOSIS — Z98.51 TUBAL LIGATION STATUS: Chronic | ICD-10-CM

## 2020-11-16 LAB — SARS-COV-2 RNA SPEC QL NAA+PROBE: SIGNIFICANT CHANGE UP

## 2020-11-16 PROCEDURE — U0003: CPT

## 2020-11-17 DIAGNOSIS — Z11.59 ENCOUNTER FOR SCREENING FOR OTHER VIRAL DISEASES: ICD-10-CM

## 2020-11-19 ENCOUNTER — RESULT REVIEW (OUTPATIENT)
Age: 35
End: 2020-11-19

## 2020-11-19 ENCOUNTER — OUTPATIENT (OUTPATIENT)
Dept: INPATIENT UNIT | Facility: HOSPITAL | Age: 35
LOS: 1 days | Discharge: HOME | End: 2020-11-19
Payer: COMMERCIAL

## 2020-11-19 ENCOUNTER — APPOINTMENT (OUTPATIENT)
Dept: OBGYN | Facility: HOSPITAL | Age: 35
End: 2020-11-19
Payer: COMMERCIAL

## 2020-11-19 VITALS
WEIGHT: 156.97 LBS | SYSTOLIC BLOOD PRESSURE: 110 MMHG | TEMPERATURE: 97 F | OXYGEN SATURATION: 100 % | HEIGHT: 58 IN | HEART RATE: 67 BPM | RESPIRATION RATE: 16 BRPM | DIASTOLIC BLOOD PRESSURE: 74 MMHG

## 2020-11-19 VITALS
HEART RATE: 80 BPM | SYSTOLIC BLOOD PRESSURE: 104 MMHG | OXYGEN SATURATION: 100 % | TEMPERATURE: 98 F | DIASTOLIC BLOOD PRESSURE: 58 MMHG | RESPIRATION RATE: 16 BRPM

## 2020-11-19 DIAGNOSIS — Z98.891 HISTORY OF UTERINE SCAR FROM PREVIOUS SURGERY: Chronic | ICD-10-CM

## 2020-11-19 DIAGNOSIS — Z98.51 TUBAL LIGATION STATUS: Chronic | ICD-10-CM

## 2020-11-19 DIAGNOSIS — N84.0 POLYP OF CORPUS UTERI: ICD-10-CM

## 2020-11-19 DIAGNOSIS — N93.9 ABNORMAL UTERINE AND VAGINAL BLEEDING, UNSPECIFIED: ICD-10-CM

## 2020-11-19 LAB — HCG UR QL: NEGATIVE — SIGNIFICANT CHANGE UP

## 2020-11-19 PROCEDURE — 88305 TISSUE EXAM BY PATHOLOGIST: CPT | Mod: 26

## 2020-11-19 PROCEDURE — 58558 HYSTEROSCOPY BIOPSY: CPT

## 2020-11-19 PROCEDURE — 81025 URINE PREGNANCY TEST: CPT

## 2020-11-19 PROCEDURE — 88305 TISSUE EXAM BY PATHOLOGIST: CPT

## 2020-11-19 RX ORDER — ACETAMINOPHEN 500 MG
1000 TABLET ORAL ONCE
Refills: 0 | Status: COMPLETED | OUTPATIENT
Start: 2020-11-19 | End: 2020-11-19

## 2020-11-19 RX ORDER — SODIUM CHLORIDE 9 MG/ML
1000 INJECTION, SOLUTION INTRAVENOUS
Refills: 0 | Status: DISCONTINUED | OUTPATIENT
Start: 2020-11-19 | End: 2020-11-19

## 2020-11-19 RX ORDER — DOCUSATE SODIUM 100 MG
1 CAPSULE ORAL
Qty: 30 | Refills: 0
Start: 2020-11-19

## 2020-11-19 RX ORDER — ONDANSETRON 8 MG/1
4 TABLET, FILM COATED ORAL EVERY 6 HOURS
Refills: 0 | Status: DISCONTINUED | OUTPATIENT
Start: 2020-11-19 | End: 2020-11-19

## 2020-11-19 RX ORDER — IBUPROFEN 200 MG
1 TABLET ORAL
Qty: 30 | Refills: 0
Start: 2020-11-19

## 2020-11-19 RX ORDER — FENTANYL CITRATE 50 UG/ML
50 INJECTION INTRAVENOUS
Refills: 0 | Status: DISCONTINUED | OUTPATIENT
Start: 2020-11-19 | End: 2020-11-19

## 2020-11-19 RX ORDER — OXYCODONE HYDROCHLORIDE 5 MG/1
5 TABLET ORAL ONCE
Refills: 0 | Status: DISCONTINUED | OUTPATIENT
Start: 2020-11-19 | End: 2020-11-19

## 2020-11-19 RX ADMIN — SODIUM CHLORIDE 75 MILLILITER(S): 9 INJECTION, SOLUTION INTRAVENOUS at 14:13

## 2020-11-19 RX ADMIN — SODIUM CHLORIDE 75 MILLILITER(S): 9 INJECTION, SOLUTION INTRAVENOUS at 14:16

## 2020-11-19 RX ADMIN — Medication 400 MILLIGRAM(S): at 14:16

## 2020-11-19 NOTE — BRIEF OPERATIVE NOTE - NSICDXBRIEFPROCEDURE_GEN_ALL_CORE_FT
PROCEDURES:  Hysteroscopy with dilation and curettage of uterus using MyoSure device 19-Nov-2020 14:29:19  Yoko De

## 2020-11-19 NOTE — BRIEF OPERATIVE NOTE - NSICDXBRIEFPOSTOP_GEN_ALL_CORE_FT
POST-OP DIAGNOSIS:  Endometrial polyp 19-Nov-2020 14:30:11  Yoko De  Abnormal uterine bleeding (AUB) 19-Nov-2020 14:30:04  Yoko De

## 2020-11-19 NOTE — BRIEF OPERATIVE NOTE - OPERATION/FINDINGS
8 cm mobile uterus. Normal adnexa. cervical Stenosis noted. Thin endometrium. Bilateral ostia noted. Small Posterior polyp.  Fluid deficit 800 cc; however large amount of fluid noted on the floor.  Uterus integrity confirmed visually at the end of the procedure.

## 2020-11-19 NOTE — BRIEF OPERATIVE NOTE - NSICDXBRIEFPREOP_GEN_ALL_CORE_FT
PRE-OP DIAGNOSIS:  Endometrial polyp 19-Nov-2020 14:29:47  Yoko De  Abnormal uterine bleeding (AUB) 19-Nov-2020 14:29:38  Yoko De

## 2020-11-19 NOTE — ASU DISCHARGE PLAN (ADULT/PEDIATRIC) - CARE PROVIDER_API CALL
Yoko De (DO; MPH)  Obstetrics and Gynecology  [address] [place] [address]  Phone: [phone]  Fax: [phone]  Follow Up Time:

## 2020-11-19 NOTE — ASU DISCHARGE PLAN (ADULT/PEDIATRIC) - CALL YOUR DOCTOR IF YOU HAVE ANY OF THE FOLLOWING:
Pain not relieved by Medications/Fever greater than (need to indicate Fahrenheit or Celsius)/Inability to tolerate liquids or foods/Unable to urinate/Nausea and vomiting that does not stop/Bleeding that does not stop

## 2020-11-24 LAB — SURGICAL PATHOLOGY STUDY: SIGNIFICANT CHANGE UP

## 2020-11-25 DIAGNOSIS — N88.2 STRICTURE AND STENOSIS OF CERVIX UTERI: ICD-10-CM

## 2020-11-25 DIAGNOSIS — N84.1 POLYP OF CERVIX UTERI: ICD-10-CM

## 2020-11-25 DIAGNOSIS — N93.9 ABNORMAL UTERINE AND VAGINAL BLEEDING, UNSPECIFIED: ICD-10-CM

## 2020-11-25 DIAGNOSIS — N72 INFLAMMATORY DISEASE OF CERVIX UTERI: ICD-10-CM

## 2020-11-25 DIAGNOSIS — N84.0 POLYP OF CORPUS UTERI: ICD-10-CM

## 2020-12-01 ENCOUNTER — APPOINTMENT (OUTPATIENT)
Dept: OBGYN | Facility: CLINIC | Age: 35
End: 2020-12-01
Payer: COMMERCIAL

## 2020-12-01 VITALS
SYSTOLIC BLOOD PRESSURE: 122 MMHG | DIASTOLIC BLOOD PRESSURE: 80 MMHG | HEIGHT: 58 IN | WEIGHT: 160 LBS | BODY MASS INDEX: 33.58 KG/M2

## 2020-12-01 PROCEDURE — 99213 OFFICE O/P EST LOW 20 MIN: CPT

## 2020-12-01 PROCEDURE — 99072 ADDL SUPL MATRL&STAF TM PHE: CPT

## 2020-12-01 NOTE — PHYSICAL EXAM
[Appropriately responsive] : appropriately responsive [Alert] : alert [No Acute Distress] : no acute distress [Soft] : soft [Non-tender] : non-tender [Non-distended] : non-distended [No Lesions] : no lesions [No Mass] : no mass [Oriented x3] : oriented x3 [Labia Majora] : normal [Labia Minora] : normal [Normal] : normal [Uterine Adnexae] : normal [FreeTextEntry4] : 20 cc of blood in the vagina from the uterus.

## 2020-12-01 NOTE — HISTORY OF PRESENT ILLNESS
[N] : Patient does not use contraception [Y] : Patient is sexually active [Currently Active] : currently active [Men] : men [No] : No [FreeTextEntry1] : 36 y/o presents for follow-up\par Patient is s/p Hysteroscopy D+C, myosure on 11/19/20.\par dx: AUB, endometrial, and cervical polyp, cervical stenosis\par \par She is tolerating diet. Denies f/c. +BM. \par She reports menses returned 11/30/20 and is heavy.\par Contraception: BTL. [Patient reported PAP Smear was abnormal] : Patient reported PAP Smear was abnormal [PapSmeardate] : 09/15/2020 [TextBox_31] : needs repeat in 1 yr [LMPDate] : 11/30/2020 [PGHxTotal] : 2 [[facility]] : 2 [[facility]] : 2

## 2020-12-01 NOTE — DISCUSSION/SUMMARY
[FreeTextEntry1] : 34 y/o\par s/p hysteroscopy, D+C, myosure\par dx: AUB, endometrial and cervical polyp, cervical stenosis\par BMI 33\par \par -reviewed pathology with patient\par -reviewed procedure and findings with patient\par -discussed that not enough endometrial tissue was obtained for pathology; endometrial lining looked normal during the procedure; there was a single endometrial polyp and an cervical polyp\par -discussed unable to remove entire endometrial polyp due to fluid deficit during procedure\par -cervical polyp was removed\par -offered aygestin script to decrease bleeding; patient declines currently\par -questions answered\par -discussed options of:\par 1. EMB in office\par 2. return to OR for repeat hysteroscopy myosure\par 3. Expectant management for 2 month and menstrual monitoring\par -patient desires EM and f/u in 2 months; should her menses con't to be heavy would recommend endometrial sampling\par \par rto 2 mos f/u

## 2020-12-03 PROBLEM — N93.9 ABNORMAL UTERINE AND VAGINAL BLEEDING, UNSPECIFIED: Chronic | Status: ACTIVE | Noted: 2020-11-12

## 2020-12-03 PROBLEM — N83.209 UNSPECIFIED OVARIAN CYST, UNSPECIFIED SIDE: Chronic | Status: ACTIVE | Noted: 2020-11-12

## 2020-12-09 ENCOUNTER — ASOB RESULT (OUTPATIENT)
Age: 35
End: 2020-12-09

## 2020-12-09 ENCOUNTER — APPOINTMENT (OUTPATIENT)
Dept: OBGYN | Facility: CLINIC | Age: 35
End: 2020-12-09
Payer: COMMERCIAL

## 2020-12-09 VITALS
SYSTOLIC BLOOD PRESSURE: 130 MMHG | TEMPERATURE: 97.3 F | DIASTOLIC BLOOD PRESSURE: 70 MMHG | BODY MASS INDEX: 33.58 KG/M2 | HEIGHT: 58 IN | WEIGHT: 160 LBS

## 2020-12-09 PROCEDURE — 76830 TRANSVAGINAL US NON-OB: CPT

## 2020-12-09 PROCEDURE — 99214 OFFICE O/P EST MOD 30 MIN: CPT | Mod: 25

## 2020-12-09 PROCEDURE — 99072 ADDL SUPL MATRL&STAF TM PHE: CPT

## 2020-12-09 NOTE — END OF VISIT
[FreeTextEntry3] : I, Nehal Foss, solely acted as scribe for Dr. Johanna Palacio on 12/09/2020.\par All medical entries made by the Scribe were at my, Dr. Palacio's, direction and personally dictated by me on 12/09/2020. I have reviewed the chart and agree that the record accurately reflects my personal performance of the history, physical exam, assessment and plan. I have also personally directed, reviewed, and agreed with the chart.  [Time Spent: ___ minutes] : I have spent [unfilled] minutes of time on the encounter. [>50% of the face to face encounter time was spent on counseling and/or coordination of care for ___] : Greater than 50% of the face to face encounter time was spent on counseling and/or coordination of care for [unfilled]

## 2020-12-09 NOTE — DISCUSSION/SUMMARY
[FreeTextEntry1] : I reviewed with patient  information charted by Dr De . I explained to patient that sometimes there are limitations in surgery  and that at times  procedures need to be aborted for patient safety \par \par Management options of menorrhagia were reviewed \par \par Will plan to repeat pelvic sonogram during her next menses and will re-evaluate at that time. Patient expressed understanding. \par \par Further medical and surgical management options were discussed including the use of a Mirena or Kyleena IUD, diagnostic hysteroscopy, D&C, ablation and/or definitive surgery ( hysterectomy.) The risks and benefits of the procedures were discussed in detail. Patient to consider.\par \par All questions and concerns addressed.

## 2020-12-09 NOTE — HISTORY OF PRESENT ILLNESS
[Y] : Patient is sexually active [Currently Active] : currently active [Men] : men [Vaginal] : vaginal [No] : No [TextBox_4] : 2nd opinion [PapSmeardate] : 9/15/20 [TextBox_31] : acute vaginitis HPV pos [LMPDate] : 11/30/20 [PGHxTotal] : 2 [[facility]] : 2 [TextBox_29] : n/a [TextBox_36] : n/a [TextBox_6] : 11/30/20 [TextBox_9] : 12 [TextBox_28] : n/a [TextBox_34] : n?a [TextBox_18] : n/a [FreeTextEntry1] : 11/30/20

## 2021-01-10 ENCOUNTER — OUTPATIENT (OUTPATIENT)
Dept: OUTPATIENT SERVICES | Facility: HOSPITAL | Age: 36
LOS: 1 days | End: 2021-01-10
Payer: COMMERCIAL

## 2021-01-10 DIAGNOSIS — Z98.891 HISTORY OF UTERINE SCAR FROM PREVIOUS SURGERY: Chronic | ICD-10-CM

## 2021-01-10 DIAGNOSIS — Z98.51 TUBAL LIGATION STATUS: Chronic | ICD-10-CM

## 2021-01-10 DIAGNOSIS — Z20.828 CONTACT WITH AND (SUSPECTED) EXPOSURE TO OTHER VIRAL COMMUNICABLE DISEASES: ICD-10-CM

## 2021-01-10 LAB — SARS-COV-2 RNA SPEC QL NAA+PROBE: SIGNIFICANT CHANGE UP

## 2021-01-10 PROCEDURE — U0003: CPT

## 2021-01-10 PROCEDURE — C9803: CPT

## 2021-01-10 PROCEDURE — U0005: CPT

## 2021-01-11 DIAGNOSIS — Z20.828 CONTACT WITH AND (SUSPECTED) EXPOSURE TO OTHER VIRAL COMMUNICABLE DISEASES: ICD-10-CM

## 2021-01-22 ENCOUNTER — APPOINTMENT (OUTPATIENT)
Dept: OBGYN | Facility: CLINIC | Age: 36
End: 2021-01-22
Payer: COMMERCIAL

## 2021-01-22 VITALS
DIASTOLIC BLOOD PRESSURE: 78 MMHG | WEIGHT: 162 LBS | SYSTOLIC BLOOD PRESSURE: 120 MMHG | BODY MASS INDEX: 34 KG/M2 | HEIGHT: 58 IN | TEMPERATURE: 98.2 F

## 2021-01-22 DIAGNOSIS — N83.201 UNSPECIFIED OVARIAN CYST, RIGHT SIDE: ICD-10-CM

## 2021-01-22 DIAGNOSIS — Z11.51 ENCOUNTER FOR SCREENING FOR HUMAN PAPILLOMAVIRUS (HPV): ICD-10-CM

## 2021-01-22 DIAGNOSIS — N84.0 POLYP OF CORPUS UTERI: ICD-10-CM

## 2021-01-22 DIAGNOSIS — Z3A.35 35 WEEKS GESTATION OF PREGNANCY: ICD-10-CM

## 2021-01-22 DIAGNOSIS — Z87.42 PERSONAL HISTORY OF OTHER DISEASES OF THE FEMALE GENITAL TRACT: ICD-10-CM

## 2021-01-22 DIAGNOSIS — N83.209 UNSPECIFIED OVARIAN CYST, UNSPECIFIED SIDE: ICD-10-CM

## 2021-01-22 PROCEDURE — 58558Z: CUSTOM | Mod: 53

## 2021-01-22 PROCEDURE — 99214 OFFICE O/P EST MOD 30 MIN: CPT | Mod: 25

## 2021-01-22 PROCEDURE — 99072 ADDL SUPL MATRL&STAF TM PHE: CPT

## 2021-01-22 RX ORDER — METRONIDAZOLE 7.5 MG/G
0.75 GEL VAGINAL
Qty: 1 | Refills: 1 | Status: DISCONTINUED | COMMUNITY
Start: 2020-11-07 | End: 2021-01-22

## 2021-01-22 RX ORDER — SULFAMETHOXAZOLE AND TRIMETHOPRIM 800; 160 MG/1; MG/1
800-160 TABLET ORAL
Qty: 28 | Refills: 0 | Status: DISCONTINUED | COMMUNITY
Start: 2020-11-05 | End: 2021-01-22

## 2021-01-22 RX ORDER — FLUCONAZOLE 150 MG/1
150 TABLET ORAL
Qty: 2 | Refills: 1 | Status: DISCONTINUED | COMMUNITY
Start: 2020-11-05 | End: 2021-01-22

## 2021-01-22 NOTE — PROCEDURE
[Endometrial Biopsy] : Endometrial biopsy [Time out performed] : Pre-procedure time out performed.  Patient's name, date of birth and procedure confirmed. [Thickened Endometrium] : thickened endometrium [Negative] : negative pregnancy test [LMPDate] : 12/28/20 [Hysteroscopy] : Hysteroscopy [Consent Obtained] : Consent obtained [Risks] : risks [Benefits] : benefits [Patient] : patient [flexible] : Using aseptic technique a hysteroscopy was performed using a flexible hysteroscope [Hemostasis obtained] : hemostasis obtained [Tolerated Well] : Patient tolerated the procedure well [de-identified] : thickened endometrium

## 2021-01-22 NOTE — DISCUSSION/SUMMARY
[FreeTextEntry1] : Procedure was aborted as the LinaScope was unable to pass the cervical canal due to cervical stenosis.\par \par Further management options were discussed including monitoring of her menses vs attempt under sedation again \par I recommended surveillance at this time given her regular menses, that her December 2020 pelvic sonogram was within wnl and prior ? polyp seen on October 2020 sonogram was not seen . She is agreeable. Will monitor with pelvic sonogram in 3 months.\par \par We discussed maintaining a menstrual calendar for further evaluation of menstrual cycle. Call parameters reviewed. All questions answered. Patient expressed understanding. \par \par Patient was encouraged to register for the portal, if not already registered. The use of the portal for non-emergent communication was discussed - patient is aware to avoid use for emergent issues. \par \par Follow up in April 2021 with sonogram. All questions and concerns were addressed.

## 2021-01-22 NOTE — END OF VISIT
[FreeTextEntry3] : I, Nehal Foss, solely acted as scribe for Dr. Johanna Palacio on 01/22/2021.\par All medical entries made by the Scribe were at my, Dr. Palacio's, direction and personally dictated by me on 01/22/2021. I have reviewed the chart and agree that the record accurately reflects my personal performance of the history, physical exam, assessment and plan. I have also personally directed, reviewed, and agreed with the chart.

## 2021-01-22 NOTE — PHYSICAL EXAM
[Appropriately responsive] : appropriately responsive [Alert] : alert [Oriented x3] : oriented x3 [Labia Majora] : normal [Labia Minora] : normal [Cervical Stenosis] : cervical stenosis [Normal] : normal [Uterine Adnexae] : normal

## 2021-01-22 NOTE — HISTORY OF PRESENT ILLNESS
[Patient reported PAP Smear was abnormal] : Patient reported PAP Smear was abnormal [HPV test offered] : HPV test offered [Tubal Occlusion] : has had a tubal occlusion [Y] : Positive pregnancy history [Menarche Age: ____] : age at menarche was [unfilled] [Currently Active] : currently active [Men] : men [Vaginal] : vaginal [No] : No [Patient refuses STI testing] : Patient refuses STI testing [PapSmeardate] : 09/15/20 +HPV [HPVDate] : 09/15/20 [LMPDate] : 12/28/20 [PGHxTotal] : 2 [[facility]] : 2 [FreeTextEntry1] : 12/28/20

## 2021-04-03 NOTE — DISCHARGE NOTE OB - WEAR SUPPORTIVE BRA
79 yo male hx of CAD s/p CABG s/p 20+ years ago, HFrEF, right sided large inguinal hernia BIBA from hotel room because he was unable to ambulate due to Rt thigh weakness and numbness, pt also had mandibular swelling for over a year. CT lumbosacral spine showed mass suggestive of mets with obliteration of neural foramina so was started on decadron.  Sacral mass biopsied 3/10 showed prostate adenocarcinoma. Mandibluar mass biopsied 3/19 also showed prostate cancer. Hospital course was complicated by hypovolemic shock likely 2/2 UGIB, required pressors and upgrade to stepdown unit. Pt currently in hospital undergoing chemo and radiotherapy.     A/P     #Metastatic prostate cancer  - currently on chemo and radiation   - continue with bicalutamide 50mg daily (started 3/14/21)  - s/p Lupron (GnRH agonist) IM, first dose 3/29 --> next dose in 1 month  - received first dose of taxotere (3/27), continue after radiation therapy is completed  - Completed RT to sacrum and spine 5 doses (3/24 - 3/30), started RT 5 DOSES to jaw area for palliation  - last RT on  4/6/21  - dental cleared for bisphosphonate, or denosumab s/p zoledronic acid 4mg q4 weeks (3/27)  - continue with Dexamethasone taper  - start Fentanyl patch, on Morphine PRN  - palliative care consult for pain management and GOC     # SCC of jaw  - ENT completed biopsy of jaw 3/19 which showed metastatic adenocarcinoma.  - Evaluated by Dental 3/24 - no intervention   - dental cleared for bisphosphonate, or denosumab  - s/p zolendronic acid (3/27)    # Hyponatremia likely secondary to SIADH   - resolving   - sodium pills   - c/w free water restriction   - monitor Na level     #  sacral ulcer  - now fouling smelling  - Burn consulted, will follow up recommendations   - start Unasyn 3mg q6  - ID consult   - send wound cultures    #Thrombocytopenia/ Leukopenia   - worsening   - oncology follow up   - likely due to chemotherapy (taxotere)  - monitor for any signs of bleeding      # UGI bleed  - s/p 1 unit of PRBC  - monitor H/H, keep Hb above 7.5 and active type/cross      # Chronic A fib  - on therapeutic dose of Lovenox     # CHFrEF  - fluid restriction 1200 ml in 24 hours   - intake and output monitoring, daily weight   - EF of 35%    # Hypercalcemia/  Vitamin D level   - resolved. likely due to advanced malignancy   -  continue with vitamin D supplements 2000units daily   -   - f/u PTHrP still pending    # Severe protein-calorie malnutrition.  - dietary on board   - supplements added   - patient reports diarrhea with ensure     #DVT PPx: Lovenox  #GI PPx: Protonix  FULL CODE    #Progress Note Handoff  Pending (specify):  start Fentanyl patch, palliative care consult for pain management and GOC conversation burn for sacral wound care ( debridement and Cx), start IV Unasyn, ID consult ( pt is immunocompromised hematology/oncology follow up for pancytopenia  Family discussion: I spoke with pt, he agreed with a plan of care   Disposition: Home___/SNF___/Other________/Unknown at this time___x _____     Statement Selected

## 2021-04-06 ENCOUNTER — EMERGENCY (EMERGENCY)
Facility: HOSPITAL | Age: 36
LOS: 1 days | Discharge: HOME | End: 2021-04-06
Payer: COMMERCIAL

## 2021-04-06 VITALS
DIASTOLIC BLOOD PRESSURE: 87 MMHG | OXYGEN SATURATION: 98 % | TEMPERATURE: 98 F | HEART RATE: 105 BPM | RESPIRATION RATE: 20 BRPM | HEIGHT: 58 IN | SYSTOLIC BLOOD PRESSURE: 123 MMHG

## 2021-04-06 DIAGNOSIS — Z98.891 HISTORY OF UTERINE SCAR FROM PREVIOUS SURGERY: Chronic | ICD-10-CM

## 2021-04-06 DIAGNOSIS — Z98.51 TUBAL LIGATION STATUS: Chronic | ICD-10-CM

## 2021-04-06 LAB — SARS-COV-2 RNA SPEC QL NAA+PROBE: DETECTED

## 2021-04-06 PROCEDURE — 99282 EMERGENCY DEPT VISIT SF MDM: CPT

## 2021-04-06 PROCEDURE — U0005: CPT

## 2021-04-06 PROCEDURE — 99283 EMERGENCY DEPT VISIT LOW MDM: CPT

## 2021-04-06 PROCEDURE — U0003: CPT

## 2021-04-06 NOTE — ED PROVIDER NOTE - OBJECTIVE STATEMENT
36yo F presenting for covid testing. Pt had positive exposure to . Pt feeling well, no symptoms. Denies fever, chills, cough, sob, cp, body aches, loss of smell/taste.

## 2021-04-06 NOTE — ED ADULT TRIAGE NOTE - CHIEF COMPLAINT QUOTE
Patient arrived to ED today to have COVID-19 swab preformed.  Patient needs swab due to her  being positive, no symptoms.

## 2021-04-06 NOTE — ED PROVIDER NOTE - PATIENT PORTAL LINK FT
You can access the FollowMyHealth Patient Portal offered by [facility] by registering at the following website: [email or web address]. By joining [facility]’s FollowMyHealth portal, you will also be able to view your health information using other applications (apps) compatible with our system.

## 2021-04-08 ENCOUNTER — APPOINTMENT (OUTPATIENT)
Dept: OBGYN | Facility: CLINIC | Age: 36
End: 2021-04-08

## 2021-04-15 ENCOUNTER — APPOINTMENT (OUTPATIENT)
Dept: OBGYN | Facility: CLINIC | Age: 36
End: 2021-04-15

## 2021-04-29 ENCOUNTER — ASOB RESULT (OUTPATIENT)
Age: 36
End: 2021-04-29

## 2021-04-29 ENCOUNTER — APPOINTMENT (OUTPATIENT)
Dept: OBGYN | Facility: CLINIC | Age: 36
End: 2021-04-29
Payer: COMMERCIAL

## 2021-04-29 VITALS
SYSTOLIC BLOOD PRESSURE: 112 MMHG | TEMPERATURE: 97.3 F | WEIGHT: 160 LBS | BODY MASS INDEX: 33.58 KG/M2 | HEIGHT: 58 IN | DIASTOLIC BLOOD PRESSURE: 64 MMHG

## 2021-04-29 PROCEDURE — 99072 ADDL SUPL MATRL&STAF TM PHE: CPT

## 2021-04-29 PROCEDURE — 58100 BIOPSY OF UTERUS LINING: CPT

## 2021-04-29 PROCEDURE — 99213 OFFICE O/P EST LOW 20 MIN: CPT | Mod: 25

## 2021-04-29 PROCEDURE — 76830 TRANSVAGINAL US NON-OB: CPT

## 2021-04-29 NOTE — PHYSICAL EXAM
[Appropriately responsive] : appropriately responsive [Alert] : alert [No Acute Distress] : no acute distress [Oriented x3] : oriented x3 [Labia Majora] : normal [Labia Minora] : normal [Discharge] : a  ~M vaginal discharge was present [No Bleeding] : There was no active vaginal bleeding [Normal] : normal [Uterine Adnexae] : normal

## 2021-04-29 NOTE — PROCEDURE
[Affirm (Triple Culture)] : Affirm (triple culture) [Endometrial Biopsy] : Endometrial biopsy [Time out performed] : Pre-procedure time out performed.  Patient's name, date of birth and procedure confirmed. [Consent Obtained] : Consent obtained [Thickened Endometrium] : thickened endometrium [Risks] : risks [Benefits] : benefits [Alternatives] : alternatives [Patient] : patient [No Premedication] : No premedication [Scant] : scant [Sent to Pathology] : placed in buffered formalin and sent for pathology [Tolerated Well] : Patient tolerated the procedure well

## 2021-05-02 ENCOUNTER — NON-APPOINTMENT (OUTPATIENT)
Age: 36
End: 2021-05-02

## 2021-05-02 NOTE — DISCUSSION/SUMMARY
[FreeTextEntry1] : We reviewed the findings of today's pelvic sonogram, significant for a thickened endometrium, 13.6 mm  and left ovarian complex cyst, 1.64 cm.\par \par An aspiration endometrial biopsy was attempted with minimal sampling obtained due to cervical stenosis. Specimen sent to pathology. Patient tolerated well.\par \par Management options for thickened endometrial lining was discussed including IUD insertion, D&C hysteroscopy, Julissa thermal ablation, and partial laparoscopic hysterectomy. The attendant risks, benefits and complications of intrauterine devices were discussed. The risks, benefits and alternatives of each procedure was also discussed in detail. I recommended D&C hysteroscopy with IUD insertion under sedation with plan to premedicate with Cytotec the night prior to the procedure. Patient is agreeable. Task sent to coordinator to schedule procedure.\par \par Patient also complaints of a possible yeast infection - Affirm culture collected. \par \par All questions were answered. \par \par During this visit 30 minutes were spent face-to-face with greater than 50% of the time dedicated to counseling.

## 2021-05-02 NOTE — HISTORY OF PRESENT ILLNESS
[N] : Patient reports normal menses [Tubal Occlusion] : has had a tubal occlusion [Y] : Positive pregnancy history [Menarche Age: ____] : age at menarche was [unfilled] [Currently Active] : currently active [Men] : men [Vaginal] : vaginal [No] : No [Patient refuses STI testing] : Patient refuses STI testing [PapSmeardate] : 9/15/2020 [TextBox_31] : reactive cellular changes [GonorrheaDate] : 3/5/2020 [TextBox_63] : neg [ChlamydiaDate] : 3/5/2020 [TextBox_68] : neg [HPVDate] : 9/15/2020 [TextBox_78] : positive [LMPDate] : 3/28/2021 [PGHxTotal] : 2 [[facility] Instit[facility]exFullTerm] : 2 [[facility]] : 2 [FreeTextEntry1] : 3/28/2021

## 2021-05-23 LAB — CORE LAB BIOPSY: NORMAL

## 2021-05-27 ENCOUNTER — APPOINTMENT (OUTPATIENT)
Dept: OBGYN | Facility: CLINIC | Age: 36
End: 2021-05-27
Payer: COMMERCIAL

## 2021-05-27 VITALS
WEIGHT: 159 LBS | BODY MASS INDEX: 33.37 KG/M2 | HEIGHT: 58 IN | TEMPERATURE: 97.5 F | DIASTOLIC BLOOD PRESSURE: 64 MMHG | SYSTOLIC BLOOD PRESSURE: 100 MMHG

## 2021-05-27 PROCEDURE — 99072 ADDL SUPL MATRL&STAF TM PHE: CPT

## 2021-05-27 PROCEDURE — 99213 OFFICE O/P EST LOW 20 MIN: CPT

## 2021-05-27 RX ORDER — CLINDAMYCIN PHOSPHATE 20 MG/G
2 CREAM VAGINAL
Qty: 1 | Refills: 0 | Status: DISCONTINUED | COMMUNITY
Start: 2021-05-02 | End: 2021-05-27

## 2021-05-27 RX ORDER — METRONIDAZOLE 7.5 MG/G
0.75 GEL VAGINAL
Qty: 1 | Refills: 1 | Status: DISCONTINUED | COMMUNITY
Start: 2021-05-02 | End: 2021-05-27

## 2021-05-28 NOTE — HISTORY OF PRESENT ILLNESS
[Patient reported PAP Smear was normal] : Patient reported PAP Smear was normal [HPV test offered] : HPV test offered [Menarche Age: ____] : age at menarche was [unfilled] [Menarche Age ____] : age at menarche was [unfilled] [Definite ___ (Date)] : the last menstrual period was [unfilled] [Excessive Bleeding] : there was excessive bleeding [N] : Patient does not use contraception [Y] : Patient is sexually active [Monogamous (Male Partner)] : is monogamous with a male partner [Currently Active] : currently active [Men] : men [Vaginal] : vaginal [No] : No [Patient refuses STI testing] : Patient refuses STI testing [TextBox_4] : PATIENT PRESENTS FOR UTERUS CONSULTATION. [TextBox_31] : NEG [Papeardate] : 09/15/20 [HPVDate] : 09/15/20 [TextBox_78] : POSITIVE [LMPDate] : 05/03/21 [PGHxTotal] : 2 [[facility]] : 2 [[facility]] : 2 [FreeTextEntry1] : 05/03/21

## 2021-05-28 NOTE — END OF VISIT
[FreeTextEntry3] : I, Nohemi Tilley, solely acted as a scribe for Dr. Reggie Alarcon on 05/27/2021 . All medical entries made by the Scribe were at my, Dr. Alarcon's, direction and personally dictated by me on 05/27/2021. I have reviewed the chart and agree that the record accurately reflects my personal performance of the history, physical exam, assessment and plan. I have also personally directed, reviewed and agreed with the chart.

## 2021-05-28 NOTE — DISCUSSION/SUMMARY
[FreeTextEntry1] : -Reviewed her pelvic sonogram from 04/29/21 which revealed a thickened endometrium measuring 13.6 mm. \par \par Management options for thickened endometrial lining was discussed including IUD insertion, D&C hysteroscopy, Julissa thermal ablation, and partial laparoscopic hysterectomy. The risks, benefits and complications of intrauterine devices were discussed. The risks, benefits and alternatives of each procedure was also discussed in detail. Patient will consider her options. \par \par Recommended a pelvic sonogram after her period. Pelvic sonogram ordered. \par \par During this visit 20 minutes were spent face-to-face with greater than 50% of the time dedicated to counseling.\par \par

## 2021-06-02 ENCOUNTER — OUTPATIENT (OUTPATIENT)
Dept: OUTPATIENT SERVICES | Facility: HOSPITAL | Age: 36
LOS: 1 days | End: 2021-06-02
Payer: COMMERCIAL

## 2021-06-02 VITALS
HEIGHT: 58 IN | WEIGHT: 160.94 LBS | RESPIRATION RATE: 20 BRPM | TEMPERATURE: 97 F | HEART RATE: 77 BPM | OXYGEN SATURATION: 99 % | SYSTOLIC BLOOD PRESSURE: 120 MMHG | DIASTOLIC BLOOD PRESSURE: 82 MMHG

## 2021-06-02 DIAGNOSIS — E66.9 OBESITY, UNSPECIFIED: ICD-10-CM

## 2021-06-02 DIAGNOSIS — Z98.51 TUBAL LIGATION STATUS: Chronic | ICD-10-CM

## 2021-06-02 DIAGNOSIS — Z01.818 ENCOUNTER FOR OTHER PREPROCEDURAL EXAMINATION: ICD-10-CM

## 2021-06-02 DIAGNOSIS — Z98.891 HISTORY OF UTERINE SCAR FROM PREVIOUS SURGERY: Chronic | ICD-10-CM

## 2021-06-02 DIAGNOSIS — Z29.9 ENCOUNTER FOR PROPHYLACTIC MEASURES, UNSPECIFIED: ICD-10-CM

## 2021-06-02 DIAGNOSIS — N93.9 ABNORMAL UTERINE AND VAGINAL BLEEDING, UNSPECIFIED: ICD-10-CM

## 2021-06-02 DIAGNOSIS — Z90.79 ACQUIRED ABSENCE OF OTHER GENITAL ORGAN(S): Chronic | ICD-10-CM

## 2021-06-02 LAB
ANION GAP SERPL CALC-SCNC: 12 MMOL/L — SIGNIFICANT CHANGE UP (ref 5–17)
BASOPHILS # BLD AUTO: 0.03 K/UL — SIGNIFICANT CHANGE UP (ref 0–0.2)
BASOPHILS NFR BLD AUTO: 0.4 % — SIGNIFICANT CHANGE UP (ref 0–2)
BUN SERPL-MCNC: 14.3 MG/DL — SIGNIFICANT CHANGE UP (ref 8–20)
CALCIUM SERPL-MCNC: 9.3 MG/DL — SIGNIFICANT CHANGE UP (ref 8.6–10.2)
CHLORIDE SERPL-SCNC: 102 MMOL/L — SIGNIFICANT CHANGE UP (ref 98–107)
CO2 SERPL-SCNC: 25 MMOL/L — SIGNIFICANT CHANGE UP (ref 22–29)
CREAT SERPL-MCNC: 0.72 MG/DL — SIGNIFICANT CHANGE UP (ref 0.5–1.3)
EOSINOPHIL # BLD AUTO: 0.17 K/UL — SIGNIFICANT CHANGE UP (ref 0–0.5)
EOSINOPHIL NFR BLD AUTO: 2.1 % — SIGNIFICANT CHANGE UP (ref 0–6)
GLUCOSE SERPL-MCNC: 103 MG/DL — HIGH (ref 70–99)
HCT VFR BLD CALC: 42.1 % — SIGNIFICANT CHANGE UP (ref 34.5–45)
HGB BLD-MCNC: 13.9 G/DL — SIGNIFICANT CHANGE UP (ref 11.5–15.5)
IMM GRANULOCYTES NFR BLD AUTO: 0.5 % — SIGNIFICANT CHANGE UP (ref 0–1.5)
LYMPHOCYTES # BLD AUTO: 1.97 K/UL — SIGNIFICANT CHANGE UP (ref 1–3.3)
LYMPHOCYTES # BLD AUTO: 24.2 % — SIGNIFICANT CHANGE UP (ref 13–44)
MCHC RBC-ENTMCNC: 30.6 PG — SIGNIFICANT CHANGE UP (ref 27–34)
MCHC RBC-ENTMCNC: 33 GM/DL — SIGNIFICANT CHANGE UP (ref 32–36)
MCV RBC AUTO: 92.7 FL — SIGNIFICANT CHANGE UP (ref 80–100)
MONOCYTES # BLD AUTO: 0.52 K/UL — SIGNIFICANT CHANGE UP (ref 0–0.9)
MONOCYTES NFR BLD AUTO: 6.4 % — SIGNIFICANT CHANGE UP (ref 2–14)
NEUTROPHILS # BLD AUTO: 5.4 K/UL — SIGNIFICANT CHANGE UP (ref 1.8–7.4)
NEUTROPHILS NFR BLD AUTO: 66.4 % — SIGNIFICANT CHANGE UP (ref 43–77)
PLATELET # BLD AUTO: 285 K/UL — SIGNIFICANT CHANGE UP (ref 150–400)
POTASSIUM SERPL-MCNC: 4.1 MMOL/L — SIGNIFICANT CHANGE UP (ref 3.5–5.3)
POTASSIUM SERPL-SCNC: 4.1 MMOL/L — SIGNIFICANT CHANGE UP (ref 3.5–5.3)
RBC # BLD: 4.54 M/UL — SIGNIFICANT CHANGE UP (ref 3.8–5.2)
RBC # FLD: 12.5 % — SIGNIFICANT CHANGE UP (ref 10.3–14.5)
SODIUM SERPL-SCNC: 139 MMOL/L — SIGNIFICANT CHANGE UP (ref 135–145)
WBC # BLD: 8.13 K/UL — SIGNIFICANT CHANGE UP (ref 3.8–10.5)
WBC # FLD AUTO: 8.13 K/UL — SIGNIFICANT CHANGE UP (ref 3.8–10.5)

## 2021-06-02 PROCEDURE — 85025 COMPLETE CBC W/AUTO DIFF WBC: CPT

## 2021-06-02 PROCEDURE — 80048 BASIC METABOLIC PNL TOTAL CA: CPT

## 2021-06-02 PROCEDURE — 36415 COLL VENOUS BLD VENIPUNCTURE: CPT

## 2021-06-02 PROCEDURE — G0463: CPT

## 2021-06-02 NOTE — H&P PST ADULT - ASSESSMENT
35 y/o obese female presents today for PST in setting of upcoming D&C hysteroscopy with possible polypectomy using myosure system on 21 with Dr. Reggie Gaspar secondary to abnormal uterine and vaginal bleeding and pelvic pain. Patient with history of diet controlled gestational diabetes in pregnancy in 2018-patient states as of recent she has had blood sugar checked since then which is "normal," and obesity, as well as b/l salpingectomy in 2018 and c sections x 2 in 2017 and 2018, . Patient states she was scheduled to have this procedure in 2021 but it was unable to be completed due to a "tight cervix." Patient states she has been told she has a thickened endometrial lining. Patient states she has had heavy menstrual bleeding since removal of her fallopian tubes in 2018. States periods are regular occurring every 28 days, states periods are heavy soaking through 8 pads on heaviest day, periods last very heavy for 3 days, and light thereafter lasting 7 days total. Patient complains of pain above pelvic area today, described as constant pressure today, rated 1/10, no exacerbating factors, no palliating factors, states she does not feel she needs to take medications for relief but that the pressure "does not feel normal," denies radiation of pain. She is unsure of duration of time of pelvic pressure.     CAPRINI SCORE    AGE RELATED RISK FACTORS                                                             [ ] Age 41-60 years                                            (1 Point)  [ ] Age: 61-74 years                                           (2 Points)                 [ ] Age= 75 years                                                (3 Points)             DISEASE RELATED RISK FACTORS                                                       [ ] Edema in the lower extremities                 (1 Point)                     [ ] Varicose veins                                               (1 Point)                                 [ ] BMI > 25 Kg/m2                                            (1 Point)                                  [ ] Serious infection (ie PNA)                            (1 Point)                     [ ] Lung disease ( COPD, Emphysema)            (1 Point)                                                                          [ ] Acute myocardial infarction                         (1 Point)                  [ ] Congestive heart failure (in the previous month)  (1 Point)         [ ] Inflammatory bowel disease                            (1 Point)                  [ ] Central venous access, PICC or Port               (2 points)       (within the last month)                                                                [ ] Stroke (in the previous month)                        (5 Points)    [ ] Previous or present malignancy                       (2 points)                                                                                                                                                         HEMATOLOGY RELATED FACTORS                                                         [ ] Prior episodes of VTE                                     (3 Points)                     [ ] Positive family history for VTE                      (3 Points)                  [ ] Prothrombin 03604 A                                     (3 Points)                     [ ] Factor V Leiden                                                (3 Points)                        [ ] Lupus anticoagulants                                      (3 Points)                                                           [ ] Anticardiolipin antibodies                              (3 Points)                                                       [ ] High homocysteine in the blood                   (3 Points)                                             [ ] Other congenital or acquired thrombophilia      (3 Points)                                                [ ] Heparin induced thrombocytopenia                  (3 Points)                                        MOBILITY RELATED FACTORS  [ ] Bed rest                                                         (1 Point)  [ ] Plaster cast                                                    (2 points)  [ ] Bed bound for more than 72 hours           (2 Points)    GENDER SPECIFIC FACTORS  [ ] Pregnancy or had a baby within the last month   (1 Point)  [ ] Post-partum < 6 weeks                                   (1 Point)  [ ] Hormonal therapy  or oral contraception   (1 Point)  [ ] History of pregnancy complications              (1 point)  [ ] Unexplained or recurrent              (1 Point)    OTHER RISK FACTORS                                           (1 Point)  [ ] BMI >40, smoking, diabetes requiring insulin, chemotherapy  blood transfusions and length of surgery over 2 hours    SURGERY RELATED RISK FACTORS  [ ]  Section within the last month     (1 Point)  [ ] Minor surgery                                                  (1 Point)  [ ] Arthroscopic surgery                                       (2 Points)  [ ] Planned major surgery lasting more            (2 Points)      than 45 minutes     [ ] Elective hip or knee joint replacement       (5 points)       surgery                                                TRAUMA RELATED RISK FACTORS  [ ] Fracture of the hip, pelvis, or leg                       (5 Points)  [ ] Spinal cord injury resulting in paralysis             (5 points)       (in the previous month)    [ ] Paralysis  (less than 1 month)                             (5 Points)  [ ] Multiple Trauma within 1 month                        (5 Points)    Total Score [        ]    Caprini Score 0-2: Low Risk, NO VTE prophylaxis required for most patients, encourage ambulation  Caprini Score 3-6: Moderate Risk , pharmacologic VTE prophylaxis is indicated for most patients (in the absence of contraindications)  Caprini Score Greater than or =7: High risk, pharmocologic VTE prophylaxis indicated for most patients (in the absence of contraindications)                               35 y/o obese female presents today for PST in setting of upcoming D&C hysteroscopy with possible polypectomy using my[employer] system on 21 with Dr. Reggie Gaspar secondary to abnormal uterine and vaginal bleeding and pelvic pain. Patient with history of diet controlled gestational diabetes in pregnancy in 2018-patient states as of recent she has had blood sugar checked since then which is "normal," and obesity, as well as b/l salpingectomy in 2018 and c sections x 2 in 2017 and 2018, . Patient states she was scheduled to have this procedure in 2021 but it was unable to be completed due to a "tight cervix." Patient states she has been told she has a thickened endometrial lining. Patient states she has had heavy menstrual bleeding since removal of her fallopian tubes in 2018. States periods are regular occurring every 28 days, states periods are heavy soaking through 8 pads on heaviest day, periods last very heavy for 3 days, and light thereafter lasting 7 days total. Patient complains of pain above pelvic area today, described as constant pressure today, rated 1/10, no exacerbating factors, no palliating factors, states she does not feel she needs to take medications for relief but that the pressure "does not feel normal," denies radiation of pain. She is unsure of duration of time of pelvic pressure.     Patient educated on COVID testing, preadmission instructions,  and day of procedure medications as per policy, patient verbalizes agreement and understanding.  Pt instructed to stop NSAIDS for one week prior to surgery. Patient verbalizes agreement and understanding.      CAPRINI SCORE    AGE RELATED RISK FACTORS                                                             [ ] Age 41-60 years                                            (1 Point)  [ ] Age: 61-74 years                                           (2 Points)                 [ ] Age= 75 years                                                (3 Points)             DISEASE RELATED RISK FACTORS                                                       [ ] Edema in the lower extremities                 (1 Point)                     [ ] Varicose veins                                               (1 Point)                                 [x ] BMI > 25 Kg/m2                                            (1 Point)                                  [ ] Serious infection (ie PNA)                            (1 Point)                     [ ] Lung disease ( COPD, Emphysema)            (1 Point)                                                                          [ ] Acute myocardial infarction                         (1 Point)                  [ ] Congestive heart failure (in the previous month)  (1 Point)         [ ] Inflammatory bowel disease                            (1 Point)                  [ ] Central venous access, PICC or Port               (2 points)       (within the last month)                                                                [ ] Stroke (in the previous month)                        (5 Points)    [ ] Previous or present malignancy                       (2 points)                                                                                                                                                         HEMATOLOGY RELATED FACTORS                                                         [ ] Prior episodes of VTE                                     (3 Points)                     [ ] Positive family history for VTE                      (3 Points)                  [ ] Prothrombin 31134 A                                     (3 Points)                     [ ] Factor V Leiden                                                (3 Points)                        [ ] Lupus anticoagulants                                      (3 Points)                                                           [ ] Anticardiolipin antibodies                              (3 Points)                                                       [ ] High homocysteine in the blood                   (3 Points)                                             [ ] Other congenital or acquired thrombophilia      (3 Points)                                                [ ] Heparin induced thrombocytopenia                  (3 Points)                                        MOBILITY RELATED FACTORS  [ ] Bed rest                                                         (1 Point)  [ ] Plaster cast                                                    (2 points)  [ ] Bed bound for more than 72 hours           (2 Points)    GENDER SPECIFIC FACTORS  [ ] Pregnancy or had a baby within the last month   (1 Point)  [ ] Post-partum < 6 weeks                                   (1 Point)  [ ] Hormonal therapy  or oral contraception   (1 Point)  [ ] History of pregnancy complications              (1 point)  [ ] Unexplained or recurrent              (1 Point)    OTHER RISK FACTORS                                           (1 Point)  [ ] BMI >40, smoking, diabetes requiring insulin, chemotherapy  blood transfusions and length of surgery over 2 hours    SURGERY RELATED RISK FACTORS  [ ]  Section within the last month     (1 Point)  [x ] Minor surgery                                                  (1 Point)  [ ] Arthroscopic surgery                                       (2 Points)  [ ] Planned major surgery lasting more            (2 Points)      than 45 minutes     [ ] Elective hip or knee joint replacement       (5 points)       surgery                                                TRAUMA RELATED RISK FACTORS  [ ] Fracture of the hip, pelvis, or leg                       (5 Points)  [ ] Spinal cord injury resulting in paralysis             (5 points)       (in the previous month)    [ ] Paralysis  (less than 1 month)                             (5 Points)  [ ] Multiple Trauma within 1 month                        (5 Points)    Total Score [    1    ]    Caprini Score 0-2: Low Risk, NO VTE prophylaxis required for most patients, encourage ambulation  Caprini Score 3-6: Moderate Risk , pharmacologic VTE prophylaxis is indicated for most patients (in the absence of contraindications)  Caprini Score Greater than or =7: High risk, pharmocologic VTE prophylaxis indicated for most patients (in the absence of contraindications)    OPIOID RISK TOOL    ANGELICA EACH BOX THAT APPLIES AND ADD TOTALS AT THE END    FAMILY HISTORY OF SUBSTANCE ABUSE                 FEMALE         MALE                                                Alcohol                             [  ]1 pt          [  ]3pts                                               Illegal Durgs                     [  ]2 pts        [  ]3pts                                               Rx Drugs                           [  ]4 pts        [  ]4 pts    PERSONAL HISTORY OF SUBSTANCE ABUSE                                                                                          Alcohol                             [  ]3 pts       [  ]3 pts                                               Illegal Drugs                     [  ]4 pts        [  ]4 pts                                               Rx Drugs                           [  ]5 pts        [  ]5 pts    AGE BETWEEN 16-45 YEARS                                      [x ]1 pt         [  ]1 pt    HISTORY OF PREADOLESCENT   SEXUAL ABUSE                                                             [  ]3 pts        [  ]0pts    PSYCHOLOGICAL DISEASE                     ADD, OCD, Bipolar, Schizophrenia        [  ]2 pts         [  ]2 pts                      Depression                                               [  ]1 pt           [  ]1 pt           SCORING TOTAL   (add numbers and type here)              (1)                                     A score of 3 or lower indicated LOW risk for future opioid abuse  A score of 4 to 7 indicated moderate risk for future opioid abuse  A score of 8 or higher indicates a high risk for opioid abuse

## 2021-06-02 NOTE — ASU PATIENT PROFILE, ADULT - LEARNING ASSESSMENT (PATIENT) ADDITIONAL COMMENTS
Np, instructed pt on pre-op instructions/teaching, tips for safer surgery, pain management scale, covid swab appt info (6/19), pt verbalized understanding of all instructions given.

## 2021-06-02 NOTE — H&P PST ADULT - HISTORY OF PRESENT ILLNESS
37 y/o female presents today from PST in setting of upcoming D&C hysteroscopy with possible polypectomy using myosure system on 21 with Dr. Reggie Gaspar secondary to abnormal uterine and vaginal bleeding and pelvic pain. Patient with history of diet controlled gestational diabetes, and obesity, as well as b/l salpingectomy in 2018 and c sections x 2 in 2017 and 2018, .     As per chart: "She has a history of thickened uterine lining. She underwent an attempted D&C hysteroscopy polypectomy with myosure on 20 however the procedure was aborted due to cervical stenosis and that the endometrial sampling was incomplete. She also underwent an attempted hysteroscopy here in the office on 21, however the procedure was aborted again due to cervical stenosis." 35 y/o female presents today from PST in setting of upcoming D&C hysteroscopy with possible polypectomy using myosure system on 21 with Dr. Reggie Gaspar secondary to abnormal uterine and vaginal bleeding and pelvic pain. Patient states she was scheduled to have this procedure in 2021 but it was unable to be completed due to a "tight cervix." Patient states she has been told she has a thickened endometrial lining. Patient states she has had heavy menstrual bleeding since removal of her fallopian tubes in 2018. States periods are regular occurring every 28 days, states periods are heavy soaking through 8 pads on heaviest day, periods last very heavy for 3 days, and light thereafter lasting 7 days total. Patient complains of pain above pelvic area today, described as constant pressure today, rated 1/10, no exacerbating factors, no palliating factors, states she does not feel she needs to take medications for relief but that the pressure "does not feel normal," denies radiation of pain. She is unsure of duration of time of pelvic pressure. Patient with history of diet controlled gestational diabetes in pregnancy in 2018-patient states as of recent she has had blood sugar checked which is "normal," and obesity, as well as b/l salpingectomy in 2018 and c sections x 2 in 2017 and 2018, . No CP or SOB on exam today.     As per chart: "She has a history of thickened uterine lining. She underwent an attempted D&C hysteroscopy polypectomy with myosure on 20 however the procedure was aborted due to cervical stenosis and that the endometrial sampling was incomplete. She also underwent an attempted hysteroscopy here in the office on 21, however the procedure was aborted again due to cervical stenosis." 37 y/o female presents today for PST in setting of upcoming D&C hysteroscopy with possible polypectomy using myosure system on 21 with Dr. Reggie Gaspar secondary to abnormal uterine and vaginal bleeding and pelvic pain. Patient with history of diet controlled gestational diabetes in pregnancy in 2018-patient states as of recent she has had blood sugar checked since then which is "normal," and obesity, as well as b/l salpingectomy in 2018 and c sections x 2 in 2017 and 2018, . Patient states she was scheduled to have this procedure in 2021 but it was unable to be completed due to a "tight cervix." Patient states she has been told she has a thickened endometrial lining. Patient states she has had heavy menstrual bleeding since removal of her fallopian tubes in 2018. States periods are regular occurring every 28 days, states periods are heavy soaking through 8 pads on heaviest day, periods last very heavy for 3 days, and light thereafter lasting 7 days total. Patient complains of pain above pelvic area today, described as constant pressure today, rated 1/10, no exacerbating factors, no palliating factors, states she does not feel she needs to take medications for relief but that the pressure "does not feel normal," denies radiation of pain. She is unsure of duration of time of pelvic pressure. No CP or SOB on exam today.     As per chart: "She has a history of thickened uterine lining. She underwent an attempted D&C hysteroscopy polypectomy with myosure on 20 however the procedure was aborted due to cervical stenosis and that the endometrial sampling was incomplete. She also underwent an attempted hysteroscopy here in the office on 21, however the procedure was aborted again due to cervical stenosis."

## 2021-06-02 NOTE — ASU PATIENT PROFILE, ADULT - INTERNATIONAL TRAVEL
S: Angelita is here today to get her Nexplanon checked. She states that this is her second remove and replace and she never had symptoms before like she had yesterday and today. It was just placed last week. She said that she feels a little achy and has some tingling in that arm and she felt like the Nexplanon moved. She was concerned something is wrong with it.     O:  MA notes reviewed and agree.  Visit Vitals  BP 96/70   Ht 5' 2\" (1.575 m)   Wt 70.4 kg   LMP 03/27/2019 (Within Days)   BMI 28.37 kg/m²   General:  No apparent distress, well-kept, good historian, alert and oriented x 3.  Reviewed allergies, medical, surgical, family, obstetrical, and social hx.   L arm: The Nexplanon is in the correct position. No sign of irritation, edema, or redness    A:  Nexplanon surv.    P:  Reassurance was given.   Explained might be a little nerve pain or small adhesions from 3 placements total.  Wrapped arm in disposable ace bandage for some support to keep IUD from being irritated with arm movement and she said that was much better.  Call or return to clinic as needed if these symptoms worsen, fail to improve as anticipated, or if new symptoms develop.     No

## 2021-06-02 NOTE — H&P PST ADULT - NSICDXPROBLEM_GEN_ALL_CORE_FT
PROBLEM DIAGNOSES  Problem: Abnormal uterine and vaginal bleeding  Assessment and Plan: D&C hysteroscopy with possible polypectomy using myosure system on 6/22/21 with Dr. Reggie Gaspar    Problem: Need for prophylactic measure  Assessment and Plan: Caprini = 1. Surgical team to evaluate need for VTE prophylaxis.     Problem: Obese  Assessment and Plan: PCP for medical management and follow up.

## 2021-06-02 NOTE — H&P PST ADULT - NSICDXPASTMEDICALHX_GEN_ALL_CORE_FT
PAST MEDICAL HISTORY:  Abnormal uterine bleeding     Diabetes, gestational 2018    Hypertension     Ovarian cyst     Thickened endometrium      PAST MEDICAL HISTORY:  Abnormal uterine bleeding     Diabetes, gestational 2018, diet controlled    HPV in female 9/2020    Hypertension     Obesity (BMI 30.0-34.9) bmi 33.6 6/2/2021    Ovarian cyst     Thickened endometrium

## 2021-06-02 NOTE — H&P PST ADULT - NSICDXPASTSURGICALHX_GEN_ALL_CORE_FT
PAST SURGICAL HISTORY:  H/O  section x2 2017, 2018    H/O tubal ligation      PAST SURGICAL HISTORY:  H/O  section x2 2017, 2018    History of bilateral salpingectomy 2018

## 2021-06-09 ENCOUNTER — APPOINTMENT (OUTPATIENT)
Dept: OBGYN | Facility: CLINIC | Age: 36
End: 2021-06-09
Payer: COMMERCIAL

## 2021-06-09 ENCOUNTER — ASOB RESULT (OUTPATIENT)
Age: 36
End: 2021-06-09

## 2021-06-09 PROCEDURE — 76830 TRANSVAGINAL US NON-OB: CPT

## 2021-06-10 ENCOUNTER — NON-APPOINTMENT (OUTPATIENT)
Age: 36
End: 2021-06-10

## 2021-06-13 NOTE — COUNSELING
Clinic Care Coordination Contact    Community Health Worker Follow Up    Goals:   Goals Addressed                 This Visit's Progress       Patient Stated      Financial Wellbeing (pt-stated)        Patient Stated: I will apply [place] including [place], [place] and [place] within the next 1-2 weeks if I am eligible.     Action Steps to Achieve this Goal   1. I understand a referral was placed to the Financial Resource Worker, I will receive a call within the next 3 business days.      2. I understand the financial worker will make two attempts to call me. If I still need help with this goal, I will update CC team on outreach calls  Updated 9- patient was unreachable for some time, but called and wants to resume this goal and was referred back to FRW.     11/16/2020-Discussed, having a hard time reaching FRW to follow up            CHW Next Follow-up: 12/11/2020    CHW Plan: To continue with outreach calls every 4-6 weeks.    Patient and CHW discussed goal progression today, patient states that she is having hard time reaching and connecting with FRW. Last encounter with FRW was 10/27/2020 where patient was to contact- patient states she is confused about what she is suppose to do next.  CHW will send message to FRW to follow up with Maude.  
[Vulvar Hygiene] : vulvar hygiene

## 2021-06-22 ENCOUNTER — APPOINTMENT (OUTPATIENT)
Dept: OBGYN | Facility: HOSPITAL | Age: 36
End: 2021-06-22

## 2021-06-23 PROBLEM — B97.7 PAPILLOMAVIRUS AS THE CAUSE OF DISEASES CLASSIFIED ELSEWHERE: Chronic | Status: ACTIVE | Noted: 2021-06-02

## 2021-06-23 PROBLEM — O24.419 GESTATIONAL DIABETES MELLITUS IN PREGNANCY, UNSPECIFIED CONTROL: Chronic | Status: ACTIVE | Noted: 2021-06-02

## 2021-06-23 PROBLEM — E66.9 OBESITY, UNSPECIFIED: Chronic | Status: ACTIVE | Noted: 2021-06-02

## 2021-06-23 PROBLEM — I10 ESSENTIAL (PRIMARY) HYPERTENSION: Chronic | Status: ACTIVE | Noted: 2021-06-02

## 2021-06-23 PROBLEM — R93.89 ABNORMAL FINDINGS ON DIAGNOSTIC IMAGING OF OTHER SPECIFIED BODY STRUCTURES: Chronic | Status: ACTIVE | Noted: 2021-06-02

## 2021-07-06 ENCOUNTER — APPOINTMENT (OUTPATIENT)
Dept: OBGYN | Facility: CLINIC | Age: 36
End: 2021-07-06

## 2021-07-22 ENCOUNTER — NON-APPOINTMENT (OUTPATIENT)
Age: 36
End: 2021-07-22

## 2021-07-22 ENCOUNTER — APPOINTMENT (OUTPATIENT)
Dept: OBGYN | Facility: CLINIC | Age: 36
End: 2021-07-22
Payer: COMMERCIAL

## 2021-07-22 VITALS
WEIGHT: 159 LBS | SYSTOLIC BLOOD PRESSURE: 112 MMHG | DIASTOLIC BLOOD PRESSURE: 64 MMHG | HEIGHT: 58 IN | BODY MASS INDEX: 33.37 KG/M2

## 2021-07-22 LAB
BILIRUB UR QL STRIP: NORMAL
GLUCOSE UR-MCNC: NORMAL
HCG UR QL: 1 EU/DL
HGB UR QL STRIP.AUTO: NORMAL
KETONES UR-MCNC: NORMAL
LEUKOCYTE ESTERASE UR QL STRIP: NORMAL
NITRITE UR QL STRIP: NORMAL
PH UR STRIP: 5.5
PROT UR STRIP-MCNC: NORMAL
SP GR UR STRIP: 1.02

## 2021-07-22 PROCEDURE — 81003 URINALYSIS AUTO W/O SCOPE: CPT | Mod: QW

## 2021-07-22 PROCEDURE — 99072 ADDL SUPL MATRL&STAF TM PHE: CPT

## 2021-07-22 PROCEDURE — 99213 OFFICE O/P EST LOW 20 MIN: CPT

## 2021-07-22 NOTE — PHYSICAL EXAM
[Appropriately responsive] : appropriately responsive [Alert] : alert [No Acute Distress] : no acute distress [Oriented x3] : oriented x3 [Labia Majora] : normal [Labia Minora] : normal [Discharge] : a  ~M vaginal discharge was present [Moderate] : moderate [White] : white [Watery] : watery [Normal] : normal

## 2021-07-22 NOTE — HISTORY OF PRESENT ILLNESS
[N] : Patient does not use contraception [Y] : Positive pregnancy history [Menarche Age: ____] : age at menarche was [unfilled] [Currently Active] : currently active [Men] : men [No] : No [Patient refuses STI testing] : Patient refuses STI testing [TextBox_4] : Zoila is here for c/o vaginal itching and irritation. this is a recurring issue for her [PapSmeardate] : 9/15/20 [TextBox_31] : NEG [HPVDate] : 9/15/20 [TextBox_78] : POSITIVE [LMPDate] : 6/29/21 [PGHxTotal] : 2 [[facility] : [facility] [[facility] [place] : 2 [FreeTextEntry1] : 6/29/21

## 2021-07-22 NOTE — PLAN
[FreeTextEntry1] : treatment for mixed vaginitis initiated. she will start using terazol and after finished use metrogel if symptoms persist\par \par we discussed limiting/eliminating added sugars, including excess carbohydrates, from her diet\par \par

## 2021-11-08 ENCOUNTER — APPOINTMENT (OUTPATIENT)
Dept: OBGYN | Facility: CLINIC | Age: 36
End: 2021-11-08
Payer: COMMERCIAL

## 2021-11-08 VITALS
BODY MASS INDEX: 32.54 KG/M2 | WEIGHT: 155 LBS | DIASTOLIC BLOOD PRESSURE: 75 MMHG | HEIGHT: 58 IN | SYSTOLIC BLOOD PRESSURE: 131 MMHG

## 2021-11-08 PROCEDURE — 99213 OFFICE O/P EST LOW 20 MIN: CPT

## 2021-11-08 PROCEDURE — 81003 URINALYSIS AUTO W/O SCOPE: CPT | Mod: QW

## 2021-11-10 LAB
BACTERIA UR CULT: NORMAL
BILIRUB UR QL STRIP: NORMAL
GLUCOSE UR-MCNC: NORMAL
HCG UR QL: 0.2 EU/DL
HGB UR QL STRIP.AUTO: NORMAL
KETONES UR-MCNC: NORMAL
LEUKOCYTE ESTERASE UR QL STRIP: NORMAL
NITRITE UR QL STRIP: NORMAL
PH UR STRIP: 5.5
PROT UR STRIP-MCNC: NORMAL
SP GR UR STRIP: 1.02

## 2021-11-10 NOTE — HISTORY OF PRESENT ILLNESS
[Gonorrhea test offered] : Gonorrhea test offered [Chlamydia test offered] : Chlamydia test offered [HPV test offered] : HPV test offered [Menarche Age: ____] : age at menarche was [unfilled] [Currently Active] : currently active [Men] : men [Vaginal] : vaginal [No] : No [N] : Patient does not use contraception [Y] : Positive pregnancy history [Papeardate] : 09/15/20 [TextBox_31] : NEG [GonorrheaDate] : 07/22/21 [TextBox_63] : NEG [ChlamydiaDate] : 07/22/21 [TextBox_68] : NEG [HPVDate] : 09/15/20 [TextBox_78] : NEG [LMPDate] : 10/26/21 [PGHxTotal] : 2 [[facility] [FreeTextEntry1] : 10/25/21

## 2021-11-10 NOTE — PHYSICAL EXAM
[Chaperone Present] : A chaperone was present in the examining room during all aspects of the physical examination [Appropriately responsive] : appropriately responsive [Alert] : alert [No Acute Distress] : no acute distress [Oriented x3] : oriented x3 [Labia Majora] : normal [Labia Minora] : normal [Discharge] : a  ~M vaginal discharge was present [Moderate] : moderate [White] : white [Thin] : thin [No Bleeding] : There was no active vaginal bleeding [Normal] : normal

## 2021-11-10 NOTE — DISCUSSION/SUMMARY
[FreeTextEntry1] : Exam consistent with BV, affirm sent to confirm. Metronidazole Rx given, she will avoid alcohol use while on this medication.\par I advised for her to start daily probiotics.\par She will RTO for further counseling regarding long-term management of recurrent BV.

## 2021-11-12 ENCOUNTER — NON-APPOINTMENT (OUTPATIENT)
Age: 36
End: 2021-11-12

## 2021-11-14 LAB
CANDIDA VAG CYTO: NOT DETECTED
G VAGINALIS+PREV SP MTYP VAG QL MICRO: NOT DETECTED
T VAGINALIS VAG QL WET PREP: NOT DETECTED

## 2021-11-23 ENCOUNTER — APPOINTMENT (OUTPATIENT)
Dept: OBGYN | Facility: CLINIC | Age: 36
End: 2021-11-23
Payer: COMMERCIAL

## 2021-11-23 ENCOUNTER — NON-APPOINTMENT (OUTPATIENT)
Age: 36
End: 2021-11-23

## 2021-11-23 VITALS
WEIGHT: 155.4 LBS | BODY MASS INDEX: 32.62 KG/M2 | HEIGHT: 58 IN | SYSTOLIC BLOOD PRESSURE: 114 MMHG | DIASTOLIC BLOOD PRESSURE: 70 MMHG

## 2021-11-23 DIAGNOSIS — N76.0 ACUTE VAGINITIS: ICD-10-CM

## 2021-11-23 DIAGNOSIS — L73.9 FOLLICULAR DISORDER, UNSPECIFIED: ICD-10-CM

## 2021-11-23 DIAGNOSIS — Z87.42 PERSONAL HISTORY OF OTHER DISEASES OF THE FEMALE GENITAL TRACT: ICD-10-CM

## 2021-11-23 DIAGNOSIS — R93.89 ABNORMAL FINDINGS ON DIAGNOSTIC IMAGING OF OTHER SPECIFIED BODY STRUCTURES: ICD-10-CM

## 2021-11-23 PROCEDURE — 99395 PREV VISIT EST AGE 18-39: CPT

## 2021-11-23 RX ORDER — TERCONAZOLE 4 MG/G
0.4 CREAM VAGINAL DAILY
Qty: 1 | Refills: 0 | Status: DISCONTINUED | COMMUNITY
Start: 2021-07-22 | End: 2021-11-23

## 2021-11-26 LAB — HPV HIGH+LOW RISK DNA PNL CVX: NOT DETECTED

## 2021-11-26 NOTE — PHYSICAL EXAM
[Appropriately responsive] : appropriately responsive [Alert] : alert [No Acute Distress] : no acute distress [No Lymphadenopathy] : no lymphadenopathy [Regular Rate Rhythm] : regular rate rhythm [No Murmurs] : no murmurs [Clear to Auscultation B/L] : clear to auscultation bilaterally [Soft] : soft [Non-tender] : non-tender [Non-distended] : non-distended [No HSM] : No HSM [No Lesions] : no lesions [No Mass] : no mass [Oriented x3] : oriented x3 [Examination Of The Breasts] : a normal appearance [No Discharge] : no discharge [No Masses] : no breast masses were palpable [Labia Majora] : normal [Labia Minora] : normal [No Bleeding] : There was no active vaginal bleeding [Normal] : normal [Uterine Adnexae] : normal [FreeTextEntry1] : David

## 2021-11-26 NOTE — HISTORY OF PRESENT ILLNESS
[Gonorrhea test offered] : Gonorrhea test offered [Chlamydia test offered] : Chlamydia test offered [HPV test offered] : HPV test offered [Y] : Positive pregnancy history [Menarche Age: ____] : age at menarche was [unfilled] [No] : Patient does not have concerns regarding sex [Currently Active] : currently active [Men] : men [Vaginal] : vaginal [Yes] : Yes [TextBox_4] : PT PRESENTS FOR HER ANNUAL EXAM [Papeardate] : 09/15/20 [TextBox_31] : NEG [GonorrheaDate] : 07/22/21 [TextBox_63] : NEG [ChlamydiaDate] : 07/22/21 [TextBox_68] : NEG [HPVDate] : 09/15/20 [TextBox_78] : POSITIVE [LMPDate] : 10/26/21 [MensesFreq] : 28 [MensesLength] : 7 [de-identified] : bilateral salpingectomy [PGHxTotal] : 2 [[facility] : [facility] [[facility] [FreeTextEntry1] : 10/26/21 [FreeTextEntry3] : tubal ligation

## 2021-11-26 NOTE — END OF VISIT
[FreeTextEntry3] : I, [Jayme Kim] solely acted as scribe for Dr. Reggie Alarcon on 11/23/2021.\par All medical entries made by the Scribe were at my, Dr. Alarcon’s, direction and personally\par dictated by me on 11/23/2021.  I have reviewed the chart and agree that the record\par accurately reflects my personal performance of the history, physical exam, assessment and plan. I\par have also personally directed, reviewed, and agreed with the chart.

## 2021-11-26 NOTE — DISCUSSION/SUMMARY
[FreeTextEntry1] : During this visit comprehensive counseling was given regarding the following concerns:\par 1 We discussed the need for proper nutrition and exercise. This includes cardiovascular and\par pelvic floor exercise.\par 2 We discussed the importance of maintaining a proper vaccination schedule and we discussed\par the utility of the flu vaccine and TDaP.\par 3 We discussed the impact of chronic sun exposure and the risk for skin disease as a result. The\par benefits of a total body scan by a Dermatologist was reviewed..\par 4 We discussed the need for certain supplements for most people which include vitamin D3,\par calcium rich foods with limitation on calcium supplementation by tabular form to 600 mg by\par mouth daily. As part of a bone health program we recommend weight bearing exercises, and\par some limited sun exposure (10-15 minutes daily) to help convert vitamin D to its active form.\par \par 5 Recommended some core exercises to tighten her abdominal muscles. \par \par 6 Recommended to decrease caloric intake.\par \par 7 Benign vaginal exam. No sign of a BV infection. Recommended to wash normally and continue using the Boriotic Plus. Stay hydrated and use cotton underwear.\par \par 8 Recommended Weight Watchers and to walk everyday to decrease bloating.\par \par 9 Recommended Allegra or Flonase for allergy symptoms that she is experiencing today.\par \par During this visit 20 minutes were spent face-to-face with greater than 50% of the time dedicated\par to counseling.

## 2021-11-30 LAB — CYTOLOGY CVX/VAG DOC THIN PREP: NORMAL

## 2021-12-02 ENCOUNTER — APPOINTMENT (OUTPATIENT)
Dept: OBGYN | Facility: CLINIC | Age: 36
End: 2021-12-02
Payer: COMMERCIAL

## 2021-12-02 VITALS
HEIGHT: 58 IN | BODY MASS INDEX: 32.32 KG/M2 | WEIGHT: 154 LBS | DIASTOLIC BLOOD PRESSURE: 70 MMHG | SYSTOLIC BLOOD PRESSURE: 110 MMHG

## 2021-12-02 PROCEDURE — 99213 OFFICE O/P EST LOW 20 MIN: CPT

## 2021-12-02 PROCEDURE — 36415 COLL VENOUS BLD VENIPUNCTURE: CPT

## 2021-12-02 NOTE — DISCUSSION/SUMMARY
[FreeTextEntry1] : Patient was educated about vulvar hygiene. Recommend avoidance of douching, perfumes, and lotions on/around vulva and in vagina. Reviewed recommendations for fragrance-free detergents and lotion. Reviewed recommendations for sleepwear and cotton underwear, avoidance of synthetic fabrics. Recommended trial of organic cotton pads. \par \par We also reviewed her symptoms of tiredness/excessive fatigue. We discussed the importance of sleep hygiene. She had normal TSH at her doctor's visit a few months ago. I advised checking for vitamin D/B12 deficiency and to rule out anemia. If she has further symptoms and testing is all normal, we discussed referral to endocrine or cardiology, or possibly sleep medicine if poor sleep is the culprit.

## 2021-12-02 NOTE — HISTORY OF PRESENT ILLNESS
[FreeTextEntry1] : Zoila presents to discuss chronic vaginal discharge and irritation. She recently had negative affirm but has had numerous episodes of BV over last few years. She currently does not feel any discharge but has occasional vaginal itching. Recent affirm was negative and pap smear was normal. \par She additionally is inquiring about a feeling of chronic tiredness. She states that she feels like she will fall asleep at many random times throughout the day. She is very concerned that something is wrong. She feels that she told her PCP who did not adequately address her symptoms.

## 2021-12-02 NOTE — REVIEW OF SYSTEMS
[Patient Intake Form Reviewed] : Patient intake form was reviewed [Fatigue] : fatigue [Fever] : no fever [Chills] : no chills [Chest Pain] : no chest pain [Abn Vaginal bleeding] : no abnormal vaginal bleeding

## 2021-12-04 LAB
25(OH)D3 SERPL-MCNC: 46.7 NG/ML
BASOPHILS # BLD AUTO: 0.04 K/UL
BASOPHILS NFR BLD AUTO: 0.6 %
EOSINOPHIL # BLD AUTO: 0.07 K/UL
EOSINOPHIL NFR BLD AUTO: 1 %
FERRITIN SERPL-MCNC: 53 NG/ML
HCT VFR BLD CALC: 43.4 %
HGB BLD-MCNC: 13.8 G/DL
IMM GRANULOCYTES NFR BLD AUTO: 0.1 %
IRON SATN MFR SERPL: 22 %
IRON SERPL-MCNC: 81 UG/DL
LYMPHOCYTES # BLD AUTO: 2.44 K/UL
LYMPHOCYTES NFR BLD AUTO: 34.5 %
MAN DIFF?: NORMAL
MCHC RBC-ENTMCNC: 30.9 PG
MCHC RBC-ENTMCNC: 31.8 GM/DL
MCV RBC AUTO: 97.3 FL
MONOCYTES # BLD AUTO: 0.48 K/UL
MONOCYTES NFR BLD AUTO: 6.8 %
NEUTROPHILS # BLD AUTO: 4.04 K/UL
NEUTROPHILS NFR BLD AUTO: 57 %
PLATELET # BLD AUTO: 298 K/UL
RBC # BLD: 4.46 M/UL
RBC # FLD: 12.7 %
TIBC SERPL-MCNC: 371 UG/DL
UIBC SERPL-MCNC: 289 UG/DL
WBC # FLD AUTO: 7.08 K/UL

## 2021-12-15 LAB — VIT B12 SERPL-MCNC: 692 PG/ML

## 2022-01-27 ENCOUNTER — APPOINTMENT (OUTPATIENT)
Dept: OBGYN | Facility: CLINIC | Age: 37
End: 2022-01-27
Payer: COMMERCIAL

## 2022-01-27 VITALS
HEIGHT: 58 IN | SYSTOLIC BLOOD PRESSURE: 126 MMHG | BODY MASS INDEX: 32.75 KG/M2 | DIASTOLIC BLOOD PRESSURE: 76 MMHG | WEIGHT: 156 LBS

## 2022-01-27 LAB
BILIRUB UR QL STRIP: NORMAL
GLUCOSE UR-MCNC: NORMAL
HCG UR QL: 0.2 EU/DL
HGB UR QL STRIP.AUTO: ABNORMAL
KETONES UR-MCNC: NORMAL
LEUKOCYTE ESTERASE UR QL STRIP: ABNORMAL
NITRITE UR QL STRIP: NORMAL
PH UR STRIP: 6.5
PROT UR STRIP-MCNC: NORMAL
SP GR UR STRIP: 1.01

## 2022-01-27 PROCEDURE — 99213 OFFICE O/P EST LOW 20 MIN: CPT

## 2022-01-27 PROCEDURE — 81003 URINALYSIS AUTO W/O SCOPE: CPT | Mod: QW

## 2022-01-27 NOTE — PHYSICAL EXAM
[Chaperone Present] : A chaperone was present in the examining room during all aspects of the physical examination [FreeTextEntry1] : DEON FAUST [Appropriately responsive] : appropriately responsive [Alert] : alert [No Acute Distress] : no acute distress [Oriented x3] : oriented x3 [Labia Majora] : normal [Labia Minora] : normal [Pink Rugae] : pink rugae [Discharge] : a  ~M vaginal discharge was present [Foul Smelling] : not foul smelling [Brown] : brown [Scant] : There was scant vaginal bleeding [Normal] : normal

## 2022-01-27 NOTE — HISTORY OF PRESENT ILLNESS
[N] : Patient does not use contraception [Y] : Positive pregnancy history [Menarche Age: ____] : age at menarche was [unfilled] [No] : Patient does not have concerns regarding sex [Currently Active] : currently active [Men] : men [Vaginal] : vaginal [PapSmeardate] : 11/23/21 [TextBox_31] : NEG [GonorrheaDate] : 07/22/21 [TextBox_63] : NEG [ChlamydiaDate] : 07/22/21 [TextBox_68] : NEG [HPVDate] : 11/23/21 [TextBox_78] : NEG [LMPDate] : 01/24/22 [PGHxTotal] : 2 [[facility]exFullTerm] : 2 [[facility]] : 2 [FreeTextEntry1] : 01/24/22

## 2022-01-27 NOTE — REVIEW OF SYSTEMS
[Patient Intake Form Reviewed] : Patient intake form was reviewed [FreeTextEntry8] : Vaginal burning and itching

## 2022-01-27 NOTE — DISCUSSION/SUMMARY
[FreeTextEntry1] : -Vaginal burning and itching-\par 1) On exam, light brown vaginal discharge noted. She is currently at the end of her menses.\par 2) Affirm collected to r/o vaginitis.\par 3) Rx Lotrisone prescribed for symptomatic relief. Instructions were reviewed.\par 4) We discussed vulvar hygiene. Recommended using unscented and undyed soaps, detergents, and lotions. Also recommended wearing cotton underwear and loose fitting clothing.\par \par -Follow up for annual GYN exam in 11/2022 or PRN.\par \par All questions and concerns were discussed.

## 2022-02-01 RX ORDER — METRONIDAZOLE 500 MG/1
500 TABLET ORAL TWICE DAILY
Qty: 20 | Refills: 0 | Status: COMPLETED | COMMUNITY
Start: 2021-11-08 | End: 2022-02-01

## 2022-02-01 RX ORDER — FLUCONAZOLE 150 MG/1
150 TABLET ORAL
Qty: 1 | Refills: 0 | Status: COMPLETED | COMMUNITY
Start: 2021-11-08 | End: 2022-02-01

## 2022-02-02 LAB
CANDIDA VAG CYTO: DETECTED
G VAGINALIS+PREV SP MTYP VAG QL MICRO: DETECTED
T VAGINALIS VAG QL WET PREP: NOT DETECTED

## 2022-02-10 ENCOUNTER — APPOINTMENT (OUTPATIENT)
Dept: FAMILY MEDICINE | Facility: CLINIC | Age: 37
End: 2022-02-10
Payer: COMMERCIAL

## 2022-02-10 ENCOUNTER — RESULT CHARGE (OUTPATIENT)
Age: 37
End: 2022-02-10

## 2022-02-10 ENCOUNTER — NON-APPOINTMENT (OUTPATIENT)
Age: 37
End: 2022-02-10

## 2022-02-10 VITALS
BODY MASS INDEX: 32.54 KG/M2 | HEART RATE: 73 BPM | OXYGEN SATURATION: 100 % | WEIGHT: 155 LBS | SYSTOLIC BLOOD PRESSURE: 120 MMHG | TEMPERATURE: 97.7 F | DIASTOLIC BLOOD PRESSURE: 64 MMHG | HEIGHT: 58 IN

## 2022-02-10 DIAGNOSIS — N94.9 UNSPECIFIED CONDITION ASSOCIATED WITH FEMALE GENITAL ORGANS AND MENSTRUAL CYCLE: ICD-10-CM

## 2022-02-10 DIAGNOSIS — Z78.9 OTHER SPECIFIED HEALTH STATUS: ICD-10-CM

## 2022-02-10 DIAGNOSIS — N76.0 ACUTE VAGINITIS: ICD-10-CM

## 2022-02-10 DIAGNOSIS — R53.83 OTHER FATIGUE: ICD-10-CM

## 2022-02-10 PROCEDURE — G0442 ANNUAL ALCOHOL SCREEN 15 MIN: CPT | Mod: NC

## 2022-02-10 PROCEDURE — 99385 PREV VISIT NEW AGE 18-39: CPT | Mod: 25

## 2022-02-10 PROCEDURE — G0444 DEPRESSION SCREEN ANNUAL: CPT | Mod: NC,59

## 2022-02-10 PROCEDURE — 36415 COLL VENOUS BLD VENIPUNCTURE: CPT

## 2022-02-10 PROCEDURE — 81025 URINE PREGNANCY TEST: CPT

## 2022-02-10 RX ORDER — TERCONAZOLE 8 MG/G
0.8 CREAM VAGINAL
Qty: 1 | Refills: 0 | Status: DISCONTINUED | COMMUNITY
Start: 2022-02-01 | End: 2022-02-10

## 2022-02-10 RX ORDER — CLINDAMYCIN PHOSPHATE 20 MG/G
2 CREAM VAGINAL
Qty: 1 | Refills: 0 | Status: DISCONTINUED | COMMUNITY
Start: 2022-02-01 | End: 2022-02-10

## 2022-02-10 RX ORDER — CLOTRIMAZOLE AND BETAMETHASONE DIPROPIONATE 10; .5 MG/G; MG/G
1-0.05 CREAM TOPICAL TWICE DAILY
Qty: 1 | Refills: 0 | Status: DISCONTINUED | COMMUNITY
Start: 2022-01-27 | End: 2022-02-10

## 2022-02-10 NOTE — HISTORY OF PRESENT ILLNESS
[FreeTextEntry1] : New Patient Establish Care\par pt c/o constipation, rectal bleeding [de-identified] : 35 yo female presents with complaint of rectal bleeding. She says that she has been feeling constipated intermittently. And during those times especially she notes blood on her napkin when she wipes. She says it causes her to have rectal pain, pain is 3/10 in severity, burning, resolves spontaneously within a few hours. In addition, she reports that there were a few episodes where she noticed bright red blood in the toilet. She says after her pregnancy she felt like something was different, she was concerned she had a hemorrhoid. In addition, she reports pelvic pain, that began 2 yrs ago, intermittent, dull, 2/10 in severity, occurs frequently. Denies fever, chills, cp, palpitations, sob, nv, heat/cold intolerance, dizziness, muscle weakness, loss of sensation, bowel/bladder incontinence or calf pain.\par

## 2022-02-10 NOTE — PHYSICAL EXAM
[Stool Occult Blood] : stool positive for occult blood [Normal] : affect was normal and insight and judgment were intact [FreeTextEntry1] : anal fissure noted, no e/o hemorrhoids

## 2022-02-10 NOTE — HEALTH RISK ASSESSMENT
[Patient reported PAP Smear was normal] : Patient reported PAP Smear was normal [Very Good] : ~his/her~  mood as very good [Never] : Never [Yes] : Yes [Monthly or less (1 pt)] : Monthly or less (1 point) [1 or 2 (0 pts)] : 1 or 2 (0 points) [Less than monthly (1 pt)] : Less than monthly (1 point) [No] : In the past 12 months have you used drugs other than those required for medical reasons? No [No falls in past year] : Patient reported no falls in the past year [0] : 2) Feeling down, depressed, or hopeless: Not at all (0) [PHQ-2 Negative - No further assessment needed] : PHQ-2 Negative - No further assessment needed [HIV test declined] : HIV test declined [Hepatitis C test declined] : Hepatitis C test declined [With Family] : lives with family [Employed] : employed [] :  [Sexually Active] : sexually active [Feels Safe at Home] : Feels safe at home [Fully functional (bathing, dressing, toileting, transferring, walking, feeding)] : Fully functional (bathing, dressing, toileting, transferring, walking, feeding) [Fully functional (using the telephone, shopping, preparing meals, housekeeping, doing laundry, using] : Fully functional and needs no help or supervision to perform IADLs (using the telephone, shopping, preparing meals, housekeeping, doing laundry, using transportation, managing medications and managing finances) [Audit-CScore] : 0 [de-identified] : needs improvement [de-identified] : needs improvement [PIP1Comkq] : 0 [High Risk Behavior] : no high risk behavior [Reports changes in hearing] : Reports no changes in hearing [Reports changes in vision] : Reports no changes in vision [Reports changes in dental health] : Reports no changes in dental health [PapSmearDate] : 11/21 [PapSmearComments] : NILM

## 2022-02-10 NOTE — ASSESSMENT
[FreeTextEntry1] : Annual physical: f/u routine labwork\par Anal fissure: recommend increased hydration, high fiber diet, psyllium supplement, will consider additional therapy if symptoms recur\par Rectal bleeding: several episodes of guerda blood in toilet bowl, no hemorrhoids felt on exam, refer to colorectal sx for colonoscopy/evaluation, no active bleeding, advised to go to ER if symptoms worsen\par Cervical stenosis/ovarian cyst: recommend f/u with GYN\par Pelvic pain: poct urine pregnancy test negative, ongoing for 2 yrs, f/u UA/UCx, f/u labwork, f/u US pelvis/tv\par Vitamin D def: f/u level\par BMI 32: Recommend low fat diet, wt loss, exercise, nutritional counseling provided, f/u lipid panel\par RTC 1 wk

## 2022-02-21 LAB
25(OH)D3 SERPL-MCNC: 47.6 NG/ML
ALBUMIN SERPL ELPH-MCNC: 4.9 G/DL
ALP BLD-CCNC: 84 U/L
ALT SERPL-CCNC: 24 U/L
AMYLASE/CREAT SERPL: 77 U/L
ANION GAP SERPL CALC-SCNC: 19 MMOL/L
APPEARANCE: ABNORMAL
AST SERPL-CCNC: 20 U/L
BACTERIA UR CULT: NORMAL
BACTERIA: NEGATIVE
BASOPHILS # BLD AUTO: 0.04 K/UL
BASOPHILS NFR BLD AUTO: 0.5 %
BILIRUB SERPL-MCNC: 0.5 MG/DL
BILIRUBIN URINE: NEGATIVE
BLOOD URINE: NEGATIVE
BUN SERPL-MCNC: 21 MG/DL
CALCIUM SERPL-MCNC: 10.1 MG/DL
CHLORIDE SERPL-SCNC: 100 MMOL/L
CHOLEST SERPL-MCNC: 204 MG/DL
CO2 SERPL-SCNC: 20 MMOL/L
COLOR: NORMAL
CREAT SERPL-MCNC: 0.75 MG/DL
EOSINOPHIL # BLD AUTO: 0.04 K/UL
EOSINOPHIL NFR BLD AUTO: 0.5 %
ESTIMATED AVERAGE GLUCOSE: 111 MG/DL
FERRITIN SERPL-MCNC: 38 NG/ML
FOLATE SERPL-MCNC: 16.4 NG/ML
GLIADIN IGA SER QL: 5.2 UNITS
GLIADIN IGG SER QL: <5 UNITS
GLIADIN PEPTIDE IGA SER-ACNC: NEGATIVE
GLIADIN PEPTIDE IGG SER-ACNC: NEGATIVE
GLUCOSE QUALITATIVE U: NEGATIVE
GLUCOSE SERPL-MCNC: 94 MG/DL
HBA1C MFR BLD HPLC: 5.5 %
HCG SERPL-MCNC: <1 MIU/ML
HCT VFR BLD CALC: 43.8 %
HDLC SERPL-MCNC: 51 MG/DL
HGB BLD-MCNC: 14.3 G/DL
HYALINE CASTS: 0 /LPF
IMM GRANULOCYTES NFR BLD AUTO: 0.3 %
IRON SATN MFR SERPL: 24 %
IRON SERPL-MCNC: 97 UG/DL
KETONES URINE: NEGATIVE
LDLC SERPL CALC-MCNC: 134 MG/DL
LEUKOCYTE ESTERASE URINE: NEGATIVE
LPL SERPL-CCNC: 48 U/L
LYMPHOCYTES # BLD AUTO: 2.07 K/UL
LYMPHOCYTES NFR BLD AUTO: 26.7 %
MAN DIFF?: NORMAL
MCHC RBC-ENTMCNC: 30.9 PG
MCHC RBC-ENTMCNC: 32.6 GM/DL
MCV RBC AUTO: 94.6 FL
MICROSCOPIC-UA: NORMAL
MONOCYTES # BLD AUTO: 0.47 K/UL
MONOCYTES NFR BLD AUTO: 6.1 %
NEUTROPHILS # BLD AUTO: 5.1 K/UL
NEUTROPHILS NFR BLD AUTO: 65.9 %
NITRITE URINE: NEGATIVE
NONHDLC SERPL-MCNC: 153 MG/DL
PH URINE: 6
PLATELET # BLD AUTO: 300 K/UL
POTASSIUM SERPL-SCNC: 4.1 MMOL/L
PROT SERPL-MCNC: 7.9 G/DL
PROTEIN URINE: NEGATIVE
RBC # BLD: 4.63 M/UL
RBC # FLD: 12.6 %
RED BLOOD CELLS URINE: 2 /HPF
SODIUM SERPL-SCNC: 138 MMOL/L
SPECIFIC GRAVITY URINE: 1.01
SQUAMOUS EPITHELIAL CELLS: 2 /HPF
TIBC SERPL-MCNC: 399 UG/DL
TRANSFERRIN SERPL-MCNC: 335 MG/DL
TRIGL SERPL-MCNC: 96 MG/DL
TSH SERPL-ACNC: 3.02 UIU/ML
TTG IGA SER IA-ACNC: <1.2 U/ML
TTG IGA SER-ACNC: NEGATIVE
TTG IGG SER IA-ACNC: 2.8 U/ML
TTG IGG SER IA-ACNC: NEGATIVE
UIBC SERPL-MCNC: 302 UG/DL
UROBILINOGEN URINE: NORMAL
VIT B12 SERPL-MCNC: 661 PG/ML
WBC # FLD AUTO: 7.74 K/UL
WHITE BLOOD CELLS URINE: 0 /HPF

## 2022-02-28 ENCOUNTER — APPOINTMENT (OUTPATIENT)
Dept: FAMILY MEDICINE | Facility: CLINIC | Age: 37
End: 2022-02-28
Payer: COMMERCIAL

## 2022-02-28 VITALS
BODY MASS INDEX: 32.75 KG/M2 | HEART RATE: 69 BPM | DIASTOLIC BLOOD PRESSURE: 82 MMHG | SYSTOLIC BLOOD PRESSURE: 110 MMHG | HEIGHT: 58 IN | WEIGHT: 156 LBS | OXYGEN SATURATION: 99 % | TEMPERATURE: 98.4 F

## 2022-02-28 PROCEDURE — 99214 OFFICE O/P EST MOD 30 MIN: CPT | Mod: 25

## 2022-02-28 PROCEDURE — 36415 COLL VENOUS BLD VENIPUNCTURE: CPT

## 2022-02-28 NOTE — ASSESSMENT
[FreeTextEntry1] : Pelvic pain: intermittent persistent pelvic pain, suspect mild constipation vs recurrent gardnerella infection s/p treatment, pregnancy test previously negative, TVUS wnl, f/u GYN, recommend psyillium powder daily, f/u CT abd/pelvis\par Rectal bleeding: no active bleeding, has appt with colorectal sx on Friday\par Mild HLD: Recommend low fat diet, wt loss, exercise, nutritional counseling provided, f/u lipid panel\par Screen for STDs: f/u labwork\par RTC 3 wks

## 2022-02-28 NOTE — REVIEW OF SYSTEMS
[Abdominal Pain] : no abdominal pain [Nausea] : no nausea [Constipation] : constipation [Diarrhea] : diarrhea [Vomiting] : no vomiting [Heartburn] : no heartburn [Melena] : no melena [Negative] : Psychiatric

## 2022-02-28 NOTE — HISTORY OF PRESENT ILLNESS
[FreeTextEntry1] : Pt is here for 2 weeks follow up. [de-identified] : 35 yo female presents for follow up of pelvic pain. She reports persistent intermittent pelvic pain, dull, 4/10 in severity at its worse, usually at 1-2/10. No dysuria, increased frequency. No discharge/bleeding. She has had persistent gardnerella infections. She has been treated with metronidazole. Denies fever, chills, cp, palpitations, sob, nv, heat/cold intolerance, dizziness, melena, hematochezia, muscle weakness, loss of sensation, bowel/bladder incontinence or calf pain.\par

## 2022-03-01 LAB
APPEARANCE: CLEAR
BACTERIA: NEGATIVE
BILIRUBIN URINE: NEGATIVE
BLOOD URINE: NEGATIVE
COLOR: NORMAL
GLUCOSE QUALITATIVE U: NEGATIVE
HBV CORE IGG+IGM SER QL: NONREACTIVE
HBV CORE IGM SER QL: NONREACTIVE
HBV SURFACE AB SER QL: REACTIVE
HBV SURFACE AG SER QL: NONREACTIVE
HCV AB SER QL: NONREACTIVE
HCV S/CO RATIO: 0.14 S/CO
HIV1+2 AB SPEC QL IA.RAPID: NONREACTIVE
HYALINE CASTS: 1 /LPF
KETONES URINE: NEGATIVE
LEUKOCYTE ESTERASE URINE: NEGATIVE
MICROSCOPIC-UA: NORMAL
NITRITE URINE: NEGATIVE
PH URINE: 6.5
PROTEIN URINE: NEGATIVE
RED BLOOD CELLS URINE: 1 /HPF
SPECIFIC GRAVITY URINE: 1.02
SQUAMOUS EPITHELIAL CELLS: 3 /HPF
T PALLIDUM AB SER QL IA: NEGATIVE
UROBILINOGEN URINE: NORMAL
WHITE BLOOD CELLS URINE: 0 /HPF

## 2022-03-02 LAB
BACTERIA UR CULT: NORMAL
C TRACH RRNA SPEC QL NAA+PROBE: NOT DETECTED
N GONORRHOEA RRNA SPEC QL NAA+PROBE: NOT DETECTED
SOURCE AMPLIFICATION: NORMAL

## 2022-03-04 ENCOUNTER — APPOINTMENT (OUTPATIENT)
Dept: COLORECTAL SURGERY | Facility: CLINIC | Age: 37
End: 2022-03-04
Payer: COMMERCIAL

## 2022-03-04 VITALS
BODY MASS INDEX: 32.32 KG/M2 | SYSTOLIC BLOOD PRESSURE: 118 MMHG | OXYGEN SATURATION: 100 % | HEART RATE: 82 BPM | WEIGHT: 154 LBS | HEIGHT: 58 IN | DIASTOLIC BLOOD PRESSURE: 81 MMHG

## 2022-03-04 PROCEDURE — 99203 OFFICE O/P NEW LOW 30 MIN: CPT

## 2022-03-05 NOTE — CONSULT LETTER
[Dear  ___] : Dear  [unfilled], [Consult Letter:] : I had the pleasure of evaluating your patient, [unfilled]. [Please see my note below.] : Please see my note below. [Consult Closing:] : Thank you very much for allowing me to participate in the care of this patient.  If you have any questions, please do not hesitate to contact me. [Sincerely,] : Sincerely, [FreeTextEntry3] : Farrukh Kirkland MD\par

## 2022-03-05 NOTE — PHYSICAL EXAM
[Exam Deferred] : exam was deferred [Respiratory Effort] : normal respiratory effort [Normal Rate and Rhythm] : normal rate and rhythm [Calm] : calm [de-identified] : Soft, nontender, nondistended.  Pfannenstiel incision noted. [de-identified] : Well-appearing, in no distress [de-identified] : Normocephalic, atraumatic [de-identified] : Moves extremities without difficulty [de-identified] : Warm and dry [de-identified] : Alert and oriented x3

## 2022-03-05 NOTE — HISTORY OF PRESENT ILLNESS
[FreeTextEntry1] : 36-year-old female presents for consultation for rectal bleeding, bloating and abdominal pain.  Abdominal pain is across the lower abdomen and has been constant for the past 2 years.  No particular alleviating or provoking symptoms of the abdominal pain.  Over the past year, she has had intermittent rectal bleeding which sometimes is related to hot sauce and associated with rectal pain but not consistently.  She does have a history of constipation and is using Metamucil to help this.  The bloating is also intermittent and may be diet related.  She is concerned about the chronicity of her symptoms and has undergone a pelvic ultrasound which was unyielding.  She is scheduled to have a CT scan of her abdomen in the next few weeks.  She denies any fevers or chills.  Occasionally has some nausea but no vomiting.  \par \par

## 2022-03-08 ENCOUNTER — NON-APPOINTMENT (OUTPATIENT)
Age: 37
End: 2022-03-08

## 2022-03-14 ENCOUNTER — APPOINTMENT (OUTPATIENT)
Dept: CT IMAGING | Facility: CLINIC | Age: 37
End: 2022-03-14

## 2022-03-15 ENCOUNTER — NON-APPOINTMENT (OUTPATIENT)
Age: 37
End: 2022-03-15

## 2022-03-15 ENCOUNTER — APPOINTMENT (OUTPATIENT)
Dept: FAMILY MEDICINE | Facility: CLINIC | Age: 37
End: 2022-03-15
Payer: COMMERCIAL

## 2022-03-15 VITALS
HEART RATE: 81 BPM | TEMPERATURE: 98.2 F | SYSTOLIC BLOOD PRESSURE: 122 MMHG | WEIGHT: 156 LBS | HEIGHT: 58 IN | DIASTOLIC BLOOD PRESSURE: 74 MMHG | OXYGEN SATURATION: 98 % | BODY MASS INDEX: 32.75 KG/M2

## 2022-03-15 PROCEDURE — 93000 ELECTROCARDIOGRAM COMPLETE: CPT

## 2022-03-15 PROCEDURE — 99214 OFFICE O/P EST MOD 30 MIN: CPT | Mod: 25

## 2022-03-15 NOTE — ASSESSMENT
[High Risk Surgery - Intraperitoneal, Intrathoracic or Supringuinal Vascular Procedures] : High Risk Surgery - Intraperitoneal, Intrathoracic or Supringuinal Vascular Procedures - No (0) [Ischemic Heart Disease] : Ischemic Heart Disease - No (0) [Congestive Heart Failure] : Congestive Heart Failure - No (0) [Prior Cerebrovascular Accident or TIA] : Prior Cerebrovascular Accident or TIA - No (0) [Creatinine >= 2mg/dL (1 Point)] : Creatinine >= 2mg/dL - No (0) [Insulin-dependent Diabetic (1 Point)] : Insulin-dependent Diabetic - No (0) [0] : 0 , RCRI Class: I, Risk of Post-Op Cardiac Complications: 3.9%, 95% CI for Risk Estimate: 2.8% - 5.4% [Patient Optimized for Surgery] : Patient optimized for surgery [FreeTextEntry4] : 35 yo female with intermittent rectal bleeding presents for pre-op examination for colonoscopy medically optimized for procedure.

## 2022-03-15 NOTE — HISTORY OF PRESENT ILLNESS
[No Pertinent Cardiac History] : no history of aortic stenosis, atrial fibrillation, coronary artery disease, recent myocardial infarction, or implantable device/pacemaker [No Pertinent Pulmonary History] : no history of asthma, COPD, sleep apnea, or smoking [No Adverse Anesthesia Reaction] : no adverse anesthesia reaction in self or family member [(Patient denies any chest pain, claudication, dyspnea on exertion, orthopnea, palpitations or syncope)] : Patient denies any chest pain, claudication, dyspnea on exertion, orthopnea, palpitations or syncope [Good (7-10 METs)] : Good (7-10 METs) [Chronic Anticoagulation] : no chronic anticoagulation [Chronic Kidney Disease] : no chronic kidney disease [Diabetes] : no diabetes [FreeTextEntry1] : Colonoscopy  [FreeTextEntry2] : 03/25/2022 [FreeTextEntry3] : Dr.Titlayo Kirkland [FreeTextEntry4] : 37 yo female with intermittent rectal bleeding presents for pre-op examination for colonoscopy. No acute complaints. No active bleeding. Last episode over one month ago. Bright red blood in stool. No mucous. Denies fever, chills, cp, palpitations, sob, nv, heat/cold intolerance, dizziness, muscle weakness, loss of sensation, bowel/bladder incontinence or calf pain.\par  [FreeTextEntry7] : EKG NSR

## 2022-03-16 ENCOUNTER — RESULT REVIEW (OUTPATIENT)
Age: 37
End: 2022-03-16

## 2022-03-25 ENCOUNTER — RESULT REVIEW (OUTPATIENT)
Age: 37
End: 2022-03-25

## 2022-03-25 ENCOUNTER — APPOINTMENT (OUTPATIENT)
Dept: COLORECTAL SURGERY | Facility: AMBULATORY MEDICAL SERVICES | Age: 37
End: 2022-03-25
Payer: COMMERCIAL

## 2022-03-25 PROCEDURE — 45380 COLONOSCOPY AND BIOPSY: CPT

## 2022-03-26 ENCOUNTER — OUTPATIENT (OUTPATIENT)
Dept: OUTPATIENT SERVICES | Facility: HOSPITAL | Age: 37
LOS: 1 days | End: 2022-03-26

## 2022-03-26 ENCOUNTER — APPOINTMENT (OUTPATIENT)
Dept: CT IMAGING | Facility: CLINIC | Age: 37
End: 2022-03-26
Payer: COMMERCIAL

## 2022-03-26 DIAGNOSIS — R10.2 PELVIC AND PERINEAL PAIN: ICD-10-CM

## 2022-03-26 DIAGNOSIS — Z90.79 ACQUIRED ABSENCE OF OTHER GENITAL ORGAN(S): Chronic | ICD-10-CM

## 2022-03-26 DIAGNOSIS — Z98.891 HISTORY OF UTERINE SCAR FROM PREVIOUS SURGERY: Chronic | ICD-10-CM

## 2022-03-26 PROCEDURE — 74176 CT ABD & PELVIS W/O CONTRAST: CPT | Mod: 26

## 2022-03-31 ENCOUNTER — APPOINTMENT (OUTPATIENT)
Dept: FAMILY MEDICINE | Facility: CLINIC | Age: 37
End: 2022-03-31
Payer: COMMERCIAL

## 2022-03-31 ENCOUNTER — LABORATORY RESULT (OUTPATIENT)
Age: 37
End: 2022-03-31

## 2022-03-31 VITALS
HEART RATE: 68 BPM | DIASTOLIC BLOOD PRESSURE: 80 MMHG | BODY MASS INDEX: 31.91 KG/M2 | WEIGHT: 152 LBS | HEIGHT: 58 IN | OXYGEN SATURATION: 98 % | SYSTOLIC BLOOD PRESSURE: 122 MMHG | TEMPERATURE: 97.8 F

## 2022-03-31 PROCEDURE — 36415 COLL VENOUS BLD VENIPUNCTURE: CPT

## 2022-03-31 PROCEDURE — 99214 OFFICE O/P EST MOD 30 MIN: CPT | Mod: 25

## 2022-03-31 NOTE — ASSESSMENT
[FreeTextEntry1] : Pelvic pain, chronic: hcg negative, STD eval negative, amylyase/lipase negative, CT abd/pelvis shows enlarged fatty liver, US pelvic normal, recommend evaluation with GYN\par Mild hepatomegaly/hepatic steatosis: recommend low fat diet, exercise, wt loss, f/u liver evaluation, avoid excess alcohol/tylenol, refer to GI\par RTC 2 wks

## 2022-03-31 NOTE — HISTORY OF PRESENT ILLNESS
[FreeTextEntry1] : follow up [de-identified] : 37 yo female presents for follow up of pelvic pain, pain is 1/10, dull, ongoing for 2 yrs, constant. In addition, pt had recent colonoscopy. Denies fever, chills, cp, palpitations, sob, nv, heat/cold intolerance, dizziness, melena, hematochezia, muscle weakness, loss of sensation, bowel/bladder incontinence or calf pain.\par

## 2022-04-03 LAB
A1AT SERPL-MCNC: 125 MG/DL
ALBUMIN SERPL ELPH-MCNC: 4.8 G/DL
ALP BLD-CCNC: 84 U/L
ALT SERPL-CCNC: 30 U/L
ANA SER IF-ACNC: NEGATIVE
AST SERPL-CCNC: 23 U/L
BILIRUB DIRECT SERPL-MCNC: 0.1 MG/DL
BILIRUB INDIRECT SERPL-MCNC: 0.2 MG/DL
BILIRUB SERPL-MCNC: 0.3 MG/DL
CERULOPLASMIN SERPL-MCNC: 27 MG/DL
HEPATITIS A IGG ANTIBODY: REACTIVE
INR PPP: 0.97 RATIO
LKM AB SER QL IF: <20.1 UNITS
MITOCHONDRIA AB SER IF-ACNC: NORMAL
PROT SERPL-MCNC: 7.7 G/DL
PT BLD: 11.4 SEC
SMOOTH MUSCLE AB SER QL IF: NORMAL

## 2022-04-05 LAB — COPPER SERPL-MCNC: 120 UG/DL

## 2022-04-07 LAB
ALBUMIN MFR SERPL ELPH: 56.8 %
ALBUMIN SERPL-MCNC: 4.4 G/DL
ALBUMIN/GLOB SERPL: 1.3 RATIO
ALPHA1 GLOB MFR SERPL ELPH: 3.4 %
ALPHA1 GLOB SERPL ELPH-MCNC: 0.3 G/DL
ALPHA2 GLOB MFR SERPL ELPH: 8.4 %
ALPHA2 GLOB SERPL ELPH-MCNC: 0.6 G/DL
B-GLOBULIN MFR SERPL ELPH: 12.9 %
B-GLOBULIN SERPL ELPH-MCNC: 1 G/DL
GAMMA GLOB FLD ELPH-MCNC: 1.4 G/DL
GAMMA GLOB MFR SERPL ELPH: 18.5 %
INTERPRETATION SERPL IEP-IMP: NORMAL
PROT SERPL-MCNC: 7.7 G/DL
PROT SERPL-MCNC: 7.7 G/DL

## 2022-07-28 ENCOUNTER — APPOINTMENT (OUTPATIENT)
Dept: FAMILY MEDICINE | Facility: CLINIC | Age: 37
End: 2022-07-28

## 2022-07-28 VITALS — BODY MASS INDEX: 32.95 KG/M2 | OXYGEN SATURATION: 98 % | WEIGHT: 157 LBS | HEART RATE: 70 BPM | HEIGHT: 58 IN

## 2022-07-28 PROCEDURE — 36415 COLL VENOUS BLD VENIPUNCTURE: CPT

## 2022-07-28 PROCEDURE — 99214 OFFICE O/P EST MOD 30 MIN: CPT | Mod: 25

## 2022-07-28 NOTE — HISTORY OF PRESENT ILLNESS
[FreeTextEntry8] : 38 yo female presents with complaint of fatigue. She says its intermittent going on for the past year. She feels very tired at work. Reports getting good sleep, eats healthy. She started exercising last week. In addition, she reports having a rash on her left arm after gardening in her backyard on Sunday. rash is very itchy. Denies fever, chills, cp, palpitations, sob, nv, heat/cold intolerance, dizziness, melena, hematochezia, muscle weakness, loss of sensation, bowel/bladder incontinence or calf pain.\par

## 2022-07-28 NOTE — ASSESSMENT
[FreeTextEntry1] : Fatigue: recommend increased hydration, improved sleep hygiene, exercise, f/u labwork\par Poison ivy dermatitis: mild, start clobetasol bid x 10 days, will consider steroids if persistent or spreads\par Venous insuff: recommend leg exercises, leg elevation, compression stockings, refer to vasc sx\par RTC 2 wks
- - -

## 2022-07-28 NOTE — REVIEW OF SYSTEMS
[Fever] : no fever [Chills] : no chills [Fatigue] : fatigue [Hot Flashes] : no hot flashes [Night Sweats] : no night sweats [Recent Change In Weight] : ~T no recent weight change [Itching] : Itching [Skin Rash] : skin rash [Negative] : Psychiatric

## 2022-07-28 NOTE — PHYSICAL EXAM
[Normal] : affect was normal and insight and judgment were intact [de-identified] : scattered telangiectasia b/l LE [de-identified] : 4x4cm area of erythema on left UE, no discharge/bleeding

## 2022-08-06 ENCOUNTER — APPOINTMENT (OUTPATIENT)
Dept: OBGYN | Facility: CLINIC | Age: 37
End: 2022-08-06

## 2022-08-06 VITALS
HEIGHT: 58 IN | SYSTOLIC BLOOD PRESSURE: 118 MMHG | DIASTOLIC BLOOD PRESSURE: 66 MMHG | BODY MASS INDEX: 32.75 KG/M2 | WEIGHT: 156 LBS

## 2022-08-06 LAB
BILIRUB UR QL STRIP: NORMAL
GLUCOSE UR-MCNC: NORMAL
HCG UR QL: 0.2 EU/DL
HGB UR QL STRIP.AUTO: NORMAL
KETONES UR-MCNC: NORMAL
LEUKOCYTE ESTERASE UR QL STRIP: NORMAL
NITRITE UR QL STRIP: NORMAL
PH UR STRIP: 5.5
PROT UR STRIP-MCNC: NORMAL
SP GR UR STRIP: 1.03

## 2022-08-06 PROCEDURE — 81003 URINALYSIS AUTO W/O SCOPE: CPT | Mod: QW

## 2022-08-06 PROCEDURE — 99213 OFFICE O/P EST LOW 20 MIN: CPT

## 2022-08-06 NOTE — HISTORY OF PRESENT ILLNESS
[N] : Patient reports normal menses [Y] : Positive pregnancy history [Menarche Age: ____] : age at menarche was [unfilled] [No] : Patient does not have concerns regarding sex [TextBox_4] : Zoila is here for c/o vaginal clear d/c and burning for the past 4 days, with occasional itching.\par \par She also c/o chronic lower pelvic/vaginal cramping 'like period cramps' for the past  two years.  She had a GI workup that was negative and a normal pelvic US in the past.  She denies any associated symptoms or exacerbating factors.  She does not think the pain is correlated to her cycle.  [PapSmeardate] : 11/23/21 [TextBox_31] : NEG [HPVDate] : 11/23/21 [TextBox_78] : NEG [LMPDate] : 07/19/22 [PGHxTotal] : 2 [[facility] [[facility] : 2 [FreeTextEntry1] : 07/19/22

## 2022-08-06 NOTE — PLAN
[FreeTextEntry1] : cultures obtained- treatment based on results, exam relatively normal today\par - no pain elicited on exam today, recommend she keep a calendar of the pain over a 3 month period to determine if there are any precipitating factors ie diet, movement, menstrual cycle, intercourse, etc.  She will return for further evaluation if the pain is persisting after 3 mos and we can review her observations and decide what futher testing may be necessary.

## 2022-08-06 NOTE — PHYSICAL EXAM
[Appropriately responsive] : appropriately responsive [Alert] : alert [No Acute Distress] : no acute distress [Soft] : soft [Non-tender] : non-tender [No Lesions] : no lesions [No Mass] : no mass [Discharge] : a  ~M vaginal discharge was present [Scant] : scant [White] : white [No Bleeding] : There was no active vaginal bleeding [Normal] : normal [Tenderness] : nontender [Enlarged ___ wks] : not enlarged [Uterine Adnexae] : non-palpable [FreeTextEntry5] : no cmt

## 2022-08-18 ENCOUNTER — APPOINTMENT (OUTPATIENT)
Dept: FAMILY MEDICINE | Facility: CLINIC | Age: 37
End: 2022-08-18

## 2022-08-25 ENCOUNTER — APPOINTMENT (OUTPATIENT)
Dept: VASCULAR SURGERY | Facility: CLINIC | Age: 37
End: 2022-08-25

## 2022-08-25 VITALS
TEMPERATURE: 97.3 F | RESPIRATION RATE: 16 BRPM | DIASTOLIC BLOOD PRESSURE: 81 MMHG | HEART RATE: 74 BPM | BODY MASS INDEX: 33.2 KG/M2 | SYSTOLIC BLOOD PRESSURE: 116 MMHG | OXYGEN SATURATION: 97 % | HEIGHT: 57.5 IN | WEIGHT: 156 LBS

## 2022-08-25 PROCEDURE — 99202 OFFICE O/P NEW SF 15 MIN: CPT

## 2022-08-25 PROCEDURE — 93970 EXTREMITY STUDY: CPT

## 2022-08-25 NOTE — PROCEDURE
[FreeTextEntry1] : Lower Extremity Venous Duplex\par RIGHT: no DVT, no SVT, no significant reflux in GSV, small caliber tributary (1.5mm)\par LEFT: no DVT, no SVT, >2s reflux in GSV, small caliber GSV (2.5mm); small caliber tributaries

## 2022-08-25 NOTE — ASSESSMENT
[FreeTextEntry1] : 36yo female mild (CEAP C-1) chronic venous insufficiency with spider veins b/l; sensation in b/l feet unlikely secondary to venous disease given description of symptoms\par -Discussed option of sclerotherapy for small spider veins; discussed high likelihood of recurrence even with sclerotherapy; patient is not interested in this therpay\par -Given mild symptoms and small size of GSV patient is not a candidate for GSV ablation\par -For foot symptoms consider alternate source; possibly neurologic \par -return to office PRN

## 2022-08-25 NOTE — PHYSICAL EXAM
[JVD] : no jugular venous distention  [Normal Breath Sounds] : Normal breath sounds [Normal Heart Sounds] : normal heart sounds [Normal Rate and Rhythm] : normal rate and rhythm [2+] : left 2+ [Ankle Swelling (On Exam)] : not present [Varicose Veins Of Lower Extremities] : not present [] : not present [de-identified] : well appearing [de-identified] : wnl [FreeTextEntry1] : scattered small spider veins b/l lower extremities; LLE collection spider veins posterior to knee CEAP C-1

## 2022-08-25 NOTE — HISTORY OF PRESENT ILLNESS
[FreeTextEntry1] : 36yo female presents for evaluation for spider veins and leg fatigue. Patient states for approximately a year or more has been feeling fatigue in her feet, states she has a sensation of tightness as if wearing shoes. She denies any swelling, varicose veins, claudication or wounds. She denies association with standing or worsening symptoms at end of day. Not relieved by leg elevation. States sometimes symptoms worsen with menstruation.\par \par She also notes scattered spider veins in bilateral thighs. These have been present for over 10yrs; shes previously had schlerotherapy after which she feels the spider veins got worse. Never wore compression stockings.\par \par No history of DVT/SVT/PE or arterial clot. Of note had COVID twice.\par \par PMH: none\par PSH: c section\par Meds: none\par Allergies metronidazole\par SocH: never smoker\par FamH: no history of coagulopathies

## 2022-11-08 ENCOUNTER — APPOINTMENT (OUTPATIENT)
Dept: FAMILY MEDICINE | Facility: CLINIC | Age: 37
End: 2022-11-08

## 2022-12-01 ENCOUNTER — APPOINTMENT (OUTPATIENT)
Dept: OBGYN | Facility: CLINIC | Age: 37
End: 2022-12-01
Payer: COMMERCIAL

## 2022-12-01 ENCOUNTER — APPOINTMENT (OUTPATIENT)
Dept: OBGYN | Facility: CLINIC | Age: 37
End: 2022-12-01

## 2022-12-01 VITALS
WEIGHT: 154 LBS | HEIGHT: 58 IN | SYSTOLIC BLOOD PRESSURE: 102 MMHG | DIASTOLIC BLOOD PRESSURE: 60 MMHG | BODY MASS INDEX: 32.32 KG/M2

## 2022-12-01 DIAGNOSIS — Z01.419 ENCOUNTER FOR GYNECOLOGICAL EXAMINATION (GENERAL) (ROUTINE) W/OUT ABNORMAL FINDINGS: ICD-10-CM

## 2022-12-01 DIAGNOSIS — Z01.818 ENCOUNTER FOR OTHER PREPROCEDURAL EXAMINATION: ICD-10-CM

## 2022-12-01 DIAGNOSIS — B96.89 ACUTE VAGINITIS: ICD-10-CM

## 2022-12-01 DIAGNOSIS — Z13.0 ENCOUNTER FOR SCREENING FOR DISEASES OF THE BLOOD AND BLOOD-FORMING ORGANS AND CERTAIN DISORDERS INVOLVING THE IMMUNE MECHANISM: ICD-10-CM

## 2022-12-01 DIAGNOSIS — Z12.4 ENCOUNTER FOR SCREENING FOR MALIGNANT NEOPLASM OF CERVIX: ICD-10-CM

## 2022-12-01 DIAGNOSIS — Z13.1 ENCOUNTER FOR SCREENING FOR DIABETES MELLITUS: ICD-10-CM

## 2022-12-01 DIAGNOSIS — N76.0 ACUTE VAGINITIS: ICD-10-CM

## 2022-12-01 DIAGNOSIS — Z13.220 ENCOUNTER FOR SCREENING FOR LIPOID DISORDERS: ICD-10-CM

## 2022-12-01 DIAGNOSIS — L23.7 ALLERGIC CONTACT DERMATITIS DUE TO PLANTS, EXCEPT FOOD: ICD-10-CM

## 2022-12-01 PROCEDURE — 99213 OFFICE O/P EST LOW 20 MIN: CPT | Mod: 25

## 2022-12-01 PROCEDURE — 99395 PREV VISIT EST AGE 18-39: CPT

## 2022-12-01 RX ORDER — PSYLLIUM SEED (WITH DEXTROSE)
28 POWDER (GRAM) ORAL
Qty: 30 | Refills: 2 | Status: DISCONTINUED | COMMUNITY
Start: 2022-02-10 | End: 2022-12-01

## 2022-12-01 RX ORDER — CLINDAMYCIN PHOSPHATE 20 MG/G
2 CREAM VAGINAL
Qty: 1 | Refills: 0 | Status: DISCONTINUED | COMMUNITY
Start: 2022-08-10 | End: 2022-12-01

## 2022-12-01 RX ORDER — CLOBETASOL PROPIONATE 0.5 MG/G
0.05 CREAM TOPICAL 3 TIMES DAILY
Qty: 1 | Refills: 0 | Status: DISCONTINUED | COMMUNITY
Start: 2022-07-28 | End: 2022-12-01

## 2022-12-05 PROBLEM — Z01.419 ENCNTR FOR GYN EXAM (GENERAL) (ROUTINE) W/O ABN FINDINGS: Status: RESOLVED | Noted: 2019-08-22 | Resolved: 2022-12-05

## 2022-12-05 PROBLEM — L23.7 POISON IVY DERMATITIS: Status: RESOLVED | Noted: 2022-07-28 | Resolved: 2022-12-05

## 2022-12-05 LAB
CANDIDA VAG CYTO: NOT DETECTED
G VAGINALIS+PREV SP MTYP VAG QL MICRO: NOT DETECTED
HPV HIGH+LOW RISK DNA PNL CVX: NOT DETECTED
T VAGINALIS VAG QL WET PREP: NOT DETECTED

## 2022-12-05 NOTE — PHYSICAL EXAM
[Chaperone Present] : A chaperone was present in the examining room during all aspects of the physical examination [Appropriately responsive] : appropriately responsive [Alert] : alert [No Acute Distress] : no acute distress [Soft] : soft [Non-tender] : non-tender [Non-distended] : non-distended [Oriented x3] : oriented x3 [Examination Of The Breasts] : a normal appearance [No Discharge] : no discharge [No Masses] : no breast masses were palpable [Labia Majora] : normal [Labia Minora] : normal [No Bleeding] : There was no active vaginal bleeding [Normal] : normal [Uterine Adnexae] : normal [FreeTextEntry1] : MOA: Lily LYN  [Labia Minora Erythema] : erythema [Discharge] : a  ~M vaginal discharge was present [Moderate] : moderate [White] : white [Thick] : thick

## 2022-12-05 NOTE — END OF VISIT
[FreeTextEntry3] : I, Judy Delacruz solely acted as scribe for Dr. Lisbeth Blas on 12/01/2022 \par All medical entries made by the Scribe were at my, Dr. Blas’s, direction and personally\par dictated by me on 12/01/2022 . I have reviewed the chart and agree that the record\par accurately reflects my personal performance of the history, physical exam, assessment and plan. I\par have also personally directed, reviewed, and agreed with the chart.

## 2022-12-05 NOTE — HISTORY OF PRESENT ILLNESS
[N] : Patient reports normal menses [Y] : Positive pregnancy history [Menarche Age: ____] : age at menarche was [unfilled] [Currently Active] : currently active [PapSmeardate] : 11/23/2021 [TextBox_31] : NEG [GonorrheaDate] : 2/28/2022 [TextBox_63] : NEG [ChlamydiaDate] : 2/28/2022 [TextBox_68] : NEG [HPVDate] : 11/23/2021 [TextBox_78] : NEG [LMPDate] : 11/19/2022 [PGHxTotal] : 2 [[facility]] : 2 [[facility]xLiving] : 2 [FreeTextEntry1] : 11/19/2022

## 2022-12-05 NOTE — DISCUSSION/SUMMARY
[FreeTextEntry1] : Benign breast and pelvic exam. . Pap done today.\par \par Affirm collected. Prescription for MetroGel given secondary to internal vaginal itching. Instructions given.\par \par Prescription for mammogram screening and breast US given.\par \par Self-breast exam reviewed.\par \par Recommended Boric Acid suppositories as a preventative measure for preventing bacterial infections. \par \par She will follow up annually and as needed.

## 2022-12-06 ENCOUNTER — APPOINTMENT (OUTPATIENT)
Dept: OBGYN | Facility: CLINIC | Age: 37
End: 2022-12-06

## 2022-12-14 LAB — CYTOLOGY CVX/VAG DOC THIN PREP: NORMAL

## 2023-01-16 ENCOUNTER — RESULT CHARGE (OUTPATIENT)
Age: 38
End: 2023-01-16

## 2023-01-17 ENCOUNTER — APPOINTMENT (OUTPATIENT)
Dept: FAMILY MEDICINE | Facility: CLINIC | Age: 38
End: 2023-01-17
Payer: COMMERCIAL

## 2023-01-17 VITALS
HEART RATE: 77 BPM | SYSTOLIC BLOOD PRESSURE: 106 MMHG | BODY MASS INDEX: 32.95 KG/M2 | TEMPERATURE: 97.8 F | HEIGHT: 58 IN | WEIGHT: 157 LBS | DIASTOLIC BLOOD PRESSURE: 62 MMHG | OXYGEN SATURATION: 98 %

## 2023-01-17 PROCEDURE — 99213 OFFICE O/P EST LOW 20 MIN: CPT | Mod: 25

## 2023-01-17 PROCEDURE — 36415 COLL VENOUS BLD VENIPUNCTURE: CPT

## 2023-01-17 RX ORDER — METRONIDAZOLE 7.5 MG/G
0.75 GEL VAGINAL
Qty: 1 | Refills: 6 | Status: DISCONTINUED | COMMUNITY
Start: 2022-12-01 | End: 2023-01-17

## 2023-01-17 NOTE — HISTORY OF PRESENT ILLNESS
[FreeTextEntry8] : 36 yo female presents with complaint of epigastric burning, she reports mild metallic taste, she says Saturday night she had a taco late at night, woke up the next morning with the burning feeling, she says it has improved. She also has intermittent dry nonproductive cough she has had it for over a month. No fever, chills, cp, palpitations, sob.

## 2023-01-17 NOTE — REVIEW OF SYSTEMS
[Negative] : Psychiatric [FreeTextEntry6] : dry nonproductive cough [FreeTextEntry7] : epigastric burning

## 2023-01-17 NOTE — ASSESSMENT
[FreeTextEntry1] : Intermittent epigastric pain: suspect reflux, poct pregnancy test negative, start omeprazole prn, avoid late night meals/snacks (2 - 3 hours before bedtime), avoid smoking, avoid tight clothing especially around stomach area, avoid foods that worsen symptoms which can include coffee, chocolate, alcohol, peppermint, and fatty foods\par RTC 1 wk\par

## 2023-01-18 LAB
ALBUMIN SERPL ELPH-MCNC: 4.5 G/DL
ALP BLD-CCNC: 84 U/L
ALT SERPL-CCNC: 28 U/L
AMYLASE/CREAT SERPL: 68 U/L
ANION GAP SERPL CALC-SCNC: 12 MMOL/L
APPEARANCE: CLEAR
AST SERPL-CCNC: 25 U/L
BACTERIA: NEGATIVE
BASOPHILS # BLD AUTO: 0.02 K/UL
BASOPHILS NFR BLD AUTO: 0.2 %
BILIRUB SERPL-MCNC: 0.4 MG/DL
BILIRUBIN URINE: NEGATIVE
BLOOD URINE: NEGATIVE
BUN SERPL-MCNC: 19 MG/DL
CALCIUM SERPL-MCNC: 9.1 MG/DL
CHLORIDE SERPL-SCNC: 104 MMOL/L
CO2 SERPL-SCNC: 22 MMOL/L
COLOR: NORMAL
CREAT SERPL-MCNC: 0.64 MG/DL
EGFR: 117 ML/MIN/1.73M2
EOSINOPHIL # BLD AUTO: 0.02 K/UL
EOSINOPHIL NFR BLD AUTO: 0.2 %
GLUCOSE QUALITATIVE U: NEGATIVE
GLUCOSE SERPL-MCNC: 101 MG/DL
HCT VFR BLD CALC: 43.1 %
HGB BLD-MCNC: 13.9 G/DL
HYALINE CASTS: 0 /LPF
IMM GRANULOCYTES NFR BLD AUTO: 0.1 %
KETONES URINE: NEGATIVE
LEUKOCYTE ESTERASE URINE: NEGATIVE
LPL SERPL-CCNC: 49 U/L
LYMPHOCYTES # BLD AUTO: 0.7 K/UL
LYMPHOCYTES NFR BLD AUTO: 8.7 %
MAN DIFF?: NORMAL
MCHC RBC-ENTMCNC: 30.3 PG
MCHC RBC-ENTMCNC: 32.3 GM/DL
MCV RBC AUTO: 94.1 FL
MICROSCOPIC-UA: NORMAL
MONOCYTES # BLD AUTO: 0.25 K/UL
MONOCYTES NFR BLD AUTO: 3.1 %
NEUTROPHILS # BLD AUTO: 7.04 K/UL
NEUTROPHILS NFR BLD AUTO: 87.7 %
NITRITE URINE: NEGATIVE
PH URINE: 6.5
PLATELET # BLD AUTO: 315 K/UL
POTASSIUM SERPL-SCNC: 4.3 MMOL/L
PROT SERPL-MCNC: 7.4 G/DL
PROTEIN URINE: NORMAL
RBC # BLD: 4.58 M/UL
RBC # FLD: 12.5 %
RED BLOOD CELLS URINE: 2 /HPF
SODIUM SERPL-SCNC: 138 MMOL/L
SPECIFIC GRAVITY URINE: 1.02
SQUAMOUS EPITHELIAL CELLS: 2 /HPF
TSH SERPL-ACNC: 1.28 UIU/ML
UROBILINOGEN URINE: NORMAL
WBC # FLD AUTO: 8.04 K/UL
WHITE BLOOD CELLS URINE: 0 /HPF

## 2023-01-27 ENCOUNTER — NON-APPOINTMENT (OUTPATIENT)
Age: 38
End: 2023-01-27

## 2023-02-13 ENCOUNTER — NON-APPOINTMENT (OUTPATIENT)
Age: 38
End: 2023-02-13

## 2023-02-13 DIAGNOSIS — Z86.2 PERSONAL HISTORY OF DISEASES OF THE BLOOD AND BLOOD-FORMING ORGANS AND CERTAIN DISORDERS INVOLVING THE IMMUNE MECHANISM: ICD-10-CM

## 2023-02-16 PROBLEM — Z86.2 HISTORY OF LYMPHOPENIA: Status: RESOLVED | Noted: 2023-01-18 | Resolved: 2023-02-16

## 2023-05-14 ENCOUNTER — OFFICE (OUTPATIENT)
Dept: URBAN - METROPOLITAN AREA CLINIC 12 | Facility: CLINIC | Age: 38
Setting detail: OPHTHALMOLOGY
End: 2023-05-14
Payer: COMMERCIAL

## 2023-05-14 DIAGNOSIS — H02.89: ICD-10-CM

## 2023-05-14 DIAGNOSIS — H16.223: ICD-10-CM

## 2023-05-14 DIAGNOSIS — H10.45: ICD-10-CM

## 2023-05-14 PROBLEM — G51.4 MYOKIYMIA: Status: ACTIVE | Noted: 2023-05-14

## 2023-05-14 PROCEDURE — 92012 INTRM OPH EXAM EST PATIENT: CPT | Performed by: STUDENT IN AN ORGANIZED HEALTH CARE EDUCATION/TRAINING PROGRAM

## 2023-05-14 ASSESSMENT — KERATOMETRY
OD_K2POWER_DIOPTERS: 45.00
OS_K1POWER_DIOPTERS: 43.50
OS_K2POWER_DIOPTERS: 44.00
OS_AXISANGLE_DEGREES: 111
OD_AXISANGLE_DEGREES: 062
OD_K1POWER_DIOPTERS: 43.25

## 2023-05-14 ASSESSMENT — REFRACTION_AUTOREFRACTION
OD_SPHERE: PLANO
OS_CYLINDER: -0.75
OD_CYLINDER: -0.25
OS_AXIS: 059
OS_SPHERE: +0.50
OD_AXIS: 121

## 2023-05-14 ASSESSMENT — REFRACTION_MANIFEST
OD_VA1: 20/25+
OD_SPHERE: -0.50

## 2023-05-14 ASSESSMENT — CONFRONTATIONAL VISUAL FIELD TEST (CVF)
OD_FINDINGS: FULL
OS_FINDINGS: FULL

## 2023-05-14 ASSESSMENT — VISUAL ACUITY
OD_BCVA: 20/25-1
OS_BCVA: 20/30-1

## 2023-05-14 ASSESSMENT — TONOMETRY
OS_IOP_MMHG: 14
OD_IOP_MMHG: 15

## 2023-05-14 ASSESSMENT — AXIALLENGTH_DERIVED: OS_AL: 23.4522

## 2023-05-14 ASSESSMENT — SPHEQUIV_DERIVED: OS_SPHEQUIV: 0.125

## 2023-05-14 ASSESSMENT — TEAR BREAK UP TIME (TBUT)
OS_TBUT: 4 SECONDS
OD_TBUT: 4 SECONDS

## 2023-06-19 ENCOUNTER — APPOINTMENT (OUTPATIENT)
Dept: FAMILY MEDICINE | Facility: CLINIC | Age: 38
End: 2023-06-19
Payer: COMMERCIAL

## 2023-06-19 VITALS
SYSTOLIC BLOOD PRESSURE: 120 MMHG | HEART RATE: 97 BPM | DIASTOLIC BLOOD PRESSURE: 78 MMHG | BODY MASS INDEX: 32.54 KG/M2 | HEIGHT: 58 IN | OXYGEN SATURATION: 98 % | TEMPERATURE: 97.7 F | WEIGHT: 155 LBS

## 2023-06-19 PROCEDURE — 99214 OFFICE O/P EST MOD 30 MIN: CPT | Mod: 25

## 2023-06-19 PROCEDURE — 36415 COLL VENOUS BLD VENIPUNCTURE: CPT

## 2023-06-19 RX ORDER — OMEPRAZOLE 40 MG/1
40 CAPSULE, DELAYED RELEASE ORAL
Qty: 30 | Refills: 0 | Status: DISCONTINUED | COMMUNITY
Start: 2023-01-17 | End: 2023-06-19

## 2023-06-19 NOTE — ASSESSMENT
[FreeTextEntry1] : Fatigue: recommend increased hydration, improved sleep hygiene, exercise, f/u labwork\par Hepatic steatosis: Recommend low fat diet, wt loss, exercise, nutritional counseling provided, avoid excess alcohol/tylenol, f/u LFTs\par Mild HLD: Recommend low fat diet, wt loss, exercise, and DASH diet, f/u lipid panel\par Vitamin D def: f/u level\par Loud snoring: f/u sleep study\par RTC 2 wks

## 2023-06-19 NOTE — HISTORY OF PRESENT ILLNESS
[FreeTextEntry8] : 37 yo female presents with complaint of fatigue. She reports daytime somnolence. She says its been going on for years. She does report loud snoring. Denies fever, chills, cp, palpitations, sob, nvcd.\par

## 2023-06-19 NOTE — HEALTH RISK ASSESSMENT
[0] : 2) Feeling down, depressed, or hopeless: Not at all (0) [PHQ-2 Negative - No further assessment needed] : PHQ-2 Negative - No further assessment needed [KDB1Glawy] : 0

## 2023-06-19 NOTE — REVIEW OF SYSTEMS
[Fatigue] : fatigue [Negative] : Psychiatric [Fever] : no fever [Chills] : no chills [Night Sweats] : no night sweats

## 2023-06-20 LAB
25(OH)D3 SERPL-MCNC: 28.8 NG/ML
ALBUMIN SERPL ELPH-MCNC: 4.6 G/DL
ALP BLD-CCNC: 103 U/L
ALT SERPL-CCNC: 27 U/L
ANION GAP SERPL CALC-SCNC: 13 MMOL/L
APPEARANCE: CLEAR
AST SERPL-CCNC: 22 U/L
BACTERIA: NEGATIVE /HPF
BILIRUB SERPL-MCNC: 0.2 MG/DL
BILIRUBIN URINE: NEGATIVE
BLOOD URINE: NEGATIVE
BUN SERPL-MCNC: 16 MG/DL
CALCIUM SERPL-MCNC: 9.6 MG/DL
CAST: 0 /LPF
CHLORIDE SERPL-SCNC: 104 MMOL/L
CHOLEST SERPL-MCNC: 195 MG/DL
CO2 SERPL-SCNC: 21 MMOL/L
COLOR: YELLOW
CREAT SERPL-MCNC: 0.65 MG/DL
EGFR: 116 ML/MIN/1.73M2
EPITHELIAL CELLS: 4 /HPF
ESTIMATED AVERAGE GLUCOSE: 123 MG/DL
GLUCOSE QUALITATIVE U: NEGATIVE MG/DL
GLUCOSE SERPL-MCNC: 88 MG/DL
HBA1C MFR BLD HPLC: 5.9 %
HDLC SERPL-MCNC: 43 MG/DL
KETONES URINE: NEGATIVE MG/DL
LDLC SERPL CALC-MCNC: 111 MG/DL
LEUKOCYTE ESTERASE URINE: ABNORMAL
MICROSCOPIC-UA: NORMAL
NITRITE URINE: NEGATIVE
NONHDLC SERPL-MCNC: 152 MG/DL
PH URINE: 6
POTASSIUM SERPL-SCNC: 4.5 MMOL/L
PROT SERPL-MCNC: 7.7 G/DL
PROTEIN URINE: NEGATIVE MG/DL
RED BLOOD CELLS URINE: 1 /HPF
SODIUM SERPL-SCNC: 138 MMOL/L
SPECIFIC GRAVITY URINE: 1.01
TRIGL SERPL-MCNC: 205 MG/DL
TSH SERPL-ACNC: 2.27 UIU/ML
UROBILINOGEN URINE: 0.2 MG/DL
WHITE BLOOD CELLS URINE: 3 /HPF

## 2023-06-27 ENCOUNTER — NON-APPOINTMENT (OUTPATIENT)
Age: 38
End: 2023-06-27

## 2023-07-10 LAB
APPEARANCE: CLEAR
BACTERIA UR CULT: NORMAL
BACTERIA: NEGATIVE /HPF
BILIRUBIN URINE: NEGATIVE
BLOOD URINE: NEGATIVE
CAST: 0 /LPF
COLOR: YELLOW
EPITHELIAL CELLS: 5 /HPF
GLUCOSE QUALITATIVE U: NEGATIVE MG/DL
KETONES URINE: NEGATIVE MG/DL
LEUKOCYTE ESTERASE URINE: ABNORMAL
MICROSCOPIC-UA: NORMAL
NITRITE URINE: NEGATIVE
PH URINE: 6.5
PROTEIN URINE: NEGATIVE MG/DL
RED BLOOD CELLS URINE: 0 /HPF
SPECIFIC GRAVITY URINE: 1.01
UROBILINOGEN URINE: 0.2 MG/DL
WHITE BLOOD CELLS URINE: 1 /HPF

## 2023-07-19 ENCOUNTER — APPOINTMENT (OUTPATIENT)
Dept: OBGYN | Facility: CLINIC | Age: 38
End: 2023-07-19
Payer: COMMERCIAL

## 2023-07-19 VITALS
BODY MASS INDEX: 32.95 KG/M2 | WEIGHT: 157 LBS | DIASTOLIC BLOOD PRESSURE: 74 MMHG | SYSTOLIC BLOOD PRESSURE: 120 MMHG | HEIGHT: 58 IN

## 2023-07-19 LAB
BILIRUB UR QL STRIP: NORMAL
GLUCOSE UR-MCNC: NORMAL
HCG UR QL: 0.2 EU/DL
HGB UR QL STRIP.AUTO: ABNORMAL
KETONES UR-MCNC: NORMAL
LEUKOCYTE ESTERASE UR QL STRIP: ABNORMAL
NITRITE UR QL STRIP: NORMAL
PH UR STRIP: 5.5
PROT UR STRIP-MCNC: NORMAL
SP GR UR STRIP: <=1.005

## 2023-07-19 PROCEDURE — 81003 URINALYSIS AUTO W/O SCOPE: CPT | Mod: NC,QW

## 2023-07-19 PROCEDURE — 99213 OFFICE O/P EST LOW 20 MIN: CPT

## 2023-07-28 ENCOUNTER — APPOINTMENT (OUTPATIENT)
Dept: UROLOGY | Facility: CLINIC | Age: 38
End: 2023-07-28
Payer: COMMERCIAL

## 2023-07-28 VITALS — SYSTOLIC BLOOD PRESSURE: 110 MMHG | HEART RATE: 64 BPM | DIASTOLIC BLOOD PRESSURE: 75 MMHG

## 2023-07-28 LAB
BILIRUB UR QL STRIP: NORMAL
CLARITY UR: CLEAR
COLLECTION METHOD: NORMAL
GLUCOSE UR-MCNC: NORMAL
HCG UR QL: 0.2 EU/DL
HGB UR QL STRIP.AUTO: NORMAL
KETONES UR-MCNC: NORMAL
LEUKOCYTE ESTERASE UR QL STRIP: NORMAL
NITRITE UR QL STRIP: NORMAL
PH UR STRIP: 5
PROT UR STRIP-MCNC: NORMAL
SP GR UR STRIP: 1

## 2023-07-28 PROCEDURE — 81003 URINALYSIS AUTO W/O SCOPE: CPT | Mod: NC,QW

## 2023-07-28 PROCEDURE — 99203 OFFICE O/P NEW LOW 30 MIN: CPT

## 2023-07-28 NOTE — HISTORY OF PRESENT ILLNESS
[FreeTextEntry1] : Found to have wbc's in urine on routine exam by pmd. Asymptomatic [Dysuria] : no dysuria [Hematuria - Gross] : no gross hematuria [Fever] : no fever [Nausea] : no nausea [None] : None

## 2023-07-28 NOTE — ASSESSMENT
[FreeTextEntry1] : Urine studies reviewed, u/a here negative.  No significant urine microscopic findings.  Discussed strategies for prevention of uti's.  Advise po hydration and f/u with Kidney/bladder ultrasound.  All questions answered.

## 2023-08-02 NOTE — HISTORY OF PRESENT ILLNESS
[N] : Patient reports normal menses [Y] : Positive pregnancy history [No] : Patient does not have concerns regarding sex [TextBox_4] : complaining of vaginal discharge and discomfort. [PapSmeardate] : 12/01/22 [TextBox_31] : NEG [HPVDate] : 12/01/22 [TextBox_78] : NEG [LMPDate] : 06/20/23 [PGHxTotal] : 2 [[facility]] : 2 [[facility]] : 2 [FreeTextEntry1] : 06/20/23

## 2023-08-02 NOTE — PHYSICAL EXAM
[Chaperone Present] : A chaperone was present in the examining room during all aspects of the physical examination [Appropriately responsive] : appropriately responsive [Alert] : alert [No Acute Distress] : no acute distress [Soft] : soft [Non-tender] : non-tender [Non-distended] : non-distended [No HSM] : No HSM [No Lesions] : no lesions [No Mass] : no mass [Oriented x3] : oriented x3 [Examination Of The Breasts] : a normal appearance [No Masses] : no breast masses were palpable [Labia Majora] : normal [Labia Minora] : normal [Normal] : normal [Uterine Adnexae] : normal [FreeTextEntry4] : whitish discharge noted.

## 2023-08-08 ENCOUNTER — OUTPATIENT (OUTPATIENT)
Dept: OUTPATIENT SERVICES | Facility: HOSPITAL | Age: 38
LOS: 1 days | End: 2023-08-08

## 2023-08-08 ENCOUNTER — APPOINTMENT (OUTPATIENT)
Dept: ULTRASOUND IMAGING | Facility: CLINIC | Age: 38
End: 2023-08-08
Payer: COMMERCIAL

## 2023-08-08 DIAGNOSIS — Z00.8 ENCOUNTER FOR OTHER GENERAL EXAMINATION: ICD-10-CM

## 2023-08-08 DIAGNOSIS — Z98.891 HISTORY OF UTERINE SCAR FROM PREVIOUS SURGERY: Chronic | ICD-10-CM

## 2023-08-08 DIAGNOSIS — Z90.79 ACQUIRED ABSENCE OF OTHER GENITAL ORGAN(S): Chronic | ICD-10-CM

## 2023-08-08 PROCEDURE — 76770 US EXAM ABDO BACK WALL COMP: CPT | Mod: 26

## 2023-09-08 ENCOUNTER — APPOINTMENT (OUTPATIENT)
Dept: UROLOGY | Facility: CLINIC | Age: 38
End: 2023-09-08
Payer: COMMERCIAL

## 2023-09-08 VITALS — HEART RATE: 77 BPM | SYSTOLIC BLOOD PRESSURE: 118 MMHG | DIASTOLIC BLOOD PRESSURE: 80 MMHG

## 2023-09-08 DIAGNOSIS — R82.90 UNSPECIFIED ABNORMAL FINDINGS IN URINE: ICD-10-CM

## 2023-09-08 PROCEDURE — 81003 URINALYSIS AUTO W/O SCOPE: CPT | Mod: NC,QW

## 2023-09-08 PROCEDURE — 99213 OFFICE O/P EST LOW 20 MIN: CPT

## 2023-09-21 PROBLEM — R82.90 ABNORMAL URINE: Status: ACTIVE | Noted: 2023-06-28

## 2023-09-21 LAB
BILIRUB UR QL STRIP: NORMAL
CLARITY UR: CLEAR
COLLECTION METHOD: NORMAL
GLUCOSE UR-MCNC: NORMAL
HCG UR QL: 1 EU/DL
HGB UR QL STRIP.AUTO: NORMAL
KETONES UR-MCNC: NORMAL
LEUKOCYTE ESTERASE UR QL STRIP: NORMAL
NITRITE UR QL STRIP: NORMAL
PH UR STRIP: 6
PROT UR STRIP-MCNC: NORMAL
SP GR UR STRIP: 1.02

## 2023-11-23 ENCOUNTER — NON-APPOINTMENT (OUTPATIENT)
Age: 38
End: 2023-11-23

## 2023-11-27 ENCOUNTER — NON-APPOINTMENT (OUTPATIENT)
Age: 38
End: 2023-11-27

## 2023-11-28 ENCOUNTER — APPOINTMENT (OUTPATIENT)
Dept: CARDIOLOGY | Facility: CLINIC | Age: 38
End: 2023-11-28
Payer: COMMERCIAL

## 2023-11-28 ENCOUNTER — NON-APPOINTMENT (OUTPATIENT)
Age: 38
End: 2023-11-28

## 2023-11-28 VITALS
OXYGEN SATURATION: 98 % | SYSTOLIC BLOOD PRESSURE: 110 MMHG | WEIGHT: 156 LBS | DIASTOLIC BLOOD PRESSURE: 80 MMHG | HEIGHT: 58 IN | RESPIRATION RATE: 16 BRPM | HEART RATE: 89 BPM | BODY MASS INDEX: 32.75 KG/M2

## 2023-11-28 PROCEDURE — 93000 ELECTROCARDIOGRAM COMPLETE: CPT

## 2023-11-28 PROCEDURE — 99204 OFFICE O/P NEW MOD 45 MIN: CPT | Mod: 25

## 2023-12-07 ENCOUNTER — APPOINTMENT (OUTPATIENT)
Dept: CARDIOLOGY | Facility: CLINIC | Age: 38
End: 2023-12-07
Payer: COMMERCIAL

## 2023-12-07 ENCOUNTER — APPOINTMENT (OUTPATIENT)
Dept: CARDIOLOGY | Facility: CLINIC | Age: 38
End: 2023-12-07

## 2023-12-07 PROCEDURE — 93306 TTE W/DOPPLER COMPLETE: CPT

## 2023-12-09 ENCOUNTER — APPOINTMENT (OUTPATIENT)
Dept: OBGYN | Facility: CLINIC | Age: 38
End: 2023-12-09

## 2023-12-12 ENCOUNTER — NON-APPOINTMENT (OUTPATIENT)
Age: 38
End: 2023-12-12

## 2024-01-06 ENCOUNTER — APPOINTMENT (OUTPATIENT)
Dept: OBGYN | Facility: CLINIC | Age: 39
End: 2024-01-06
Payer: COMMERCIAL

## 2024-01-06 ENCOUNTER — NON-APPOINTMENT (OUTPATIENT)
Age: 39
End: 2024-01-06

## 2024-01-06 VITALS
SYSTOLIC BLOOD PRESSURE: 130 MMHG | HEIGHT: 58 IN | BODY MASS INDEX: 32.8 KG/M2 | WEIGHT: 156.25 LBS | DIASTOLIC BLOOD PRESSURE: 80 MMHG

## 2024-01-06 DIAGNOSIS — Z01.419 ENCOUNTER FOR GYNECOLOGICAL EXAMINATION (GENERAL) (ROUTINE) W/OUT ABNORMAL FINDINGS: ICD-10-CM

## 2024-01-06 DIAGNOSIS — Z12.4 ENCOUNTER FOR SCREENING FOR MALIGNANT NEOPLASM OF CERVIX: ICD-10-CM

## 2024-01-06 PROCEDURE — 99213 OFFICE O/P EST LOW 20 MIN: CPT | Mod: 25

## 2024-01-06 PROCEDURE — 99395 PREV VISIT EST AGE 18-39: CPT

## 2024-01-06 RX ORDER — FLUCONAZOLE 150 MG/1
150 TABLET ORAL
Qty: 2 | Refills: 0 | Status: DISCONTINUED | COMMUNITY
Start: 2023-07-20 | End: 2024-01-06

## 2024-01-06 RX ORDER — CLOTRIMAZOLE AND BETAMETHASONE DIPROPIONATE 10; .5 MG/G; MG/G
1-0.05 CREAM TOPICAL
Qty: 1 | Refills: 0 | Status: DISCONTINUED | COMMUNITY
Start: 2023-07-26 | End: 2024-01-06

## 2024-01-10 LAB
CANDIDA VAG CYTO: NOT DETECTED
G VAGINALIS+PREV SP MTYP VAG QL MICRO: DETECTED
HPV HIGH+LOW RISK DNA PNL CVX: NOT DETECTED
T VAGINALIS VAG QL WET PREP: NOT DETECTED

## 2024-01-13 LAB — CYTOLOGY CVX/VAG DOC THIN PREP: NORMAL

## 2024-02-12 ENCOUNTER — APPOINTMENT (OUTPATIENT)
Dept: ULTRASOUND IMAGING | Facility: CLINIC | Age: 39
End: 2024-02-12
Payer: COMMERCIAL

## 2024-02-12 ENCOUNTER — RESULT REVIEW (OUTPATIENT)
Age: 39
End: 2024-02-12

## 2024-02-12 ENCOUNTER — OUTPATIENT (OUTPATIENT)
Dept: OUTPATIENT SERVICES | Facility: HOSPITAL | Age: 39
LOS: 1 days | End: 2024-02-12
Payer: COMMERCIAL

## 2024-02-12 ENCOUNTER — APPOINTMENT (OUTPATIENT)
Dept: MAMMOGRAPHY | Facility: CLINIC | Age: 39
End: 2024-02-12
Payer: COMMERCIAL

## 2024-02-12 DIAGNOSIS — Z98.891 HISTORY OF UTERINE SCAR FROM PREVIOUS SURGERY: Chronic | ICD-10-CM

## 2024-02-12 DIAGNOSIS — N64.4 MASTODYNIA: ICD-10-CM

## 2024-02-12 DIAGNOSIS — Z90.79 ACQUIRED ABSENCE OF OTHER GENITAL ORGAN(S): Chronic | ICD-10-CM

## 2024-02-12 PROCEDURE — 77066 DX MAMMO INCL CAD BI: CPT | Mod: 26

## 2024-02-12 PROCEDURE — 77066 DX MAMMO INCL CAD BI: CPT

## 2024-02-12 PROCEDURE — G0279: CPT | Mod: 26

## 2024-02-12 PROCEDURE — 76641 ULTRASOUND BREAST COMPLETE: CPT | Mod: 26,50

## 2024-02-12 PROCEDURE — G0279: CPT

## 2024-02-12 PROCEDURE — 76641 ULTRASOUND BREAST COMPLETE: CPT

## 2024-02-17 PROBLEM — Z01.419 WELL FEMALE EXAM WITH ROUTINE GYNECOLOGICAL EXAM: Status: ACTIVE | Noted: 2024-02-17

## 2024-02-17 NOTE — PLAN
[FreeTextEntry1] : chronic left breast pain - common remedies discussed including vit e, decreasing caffeine, primrose oil, etc. - imaging ordered-  will f/u regarding results  vaginal irritation - culture obtained, tx based on results

## 2024-02-17 NOTE — HISTORY OF PRESENT ILLNESS
[Patient reported PAP Smear was normal] : Patient reported PAP Smear was normal [N] : Patient does not use contraception [Y] : Patient is sexually active [Menarche Age: ____] : age at menarche was [unfilled] [Currently Active] : currently active [Regular Cycle Intervals] : periods have been regular [Men] : men [Vaginal] : vaginal [No] : No [TextBox_4] : Zoila is here for an annual exam. she states she had a uti about a month ago, was treated with abx.  She now feels some vaginal itching.  Her left breast feels sore when she presses on it, noticed during a routine self exam.   [PapSmeardate] : 12/01/2022 [HIVDate] : 02/28/2022 [TextBox_53] : NEG [TextBox_63] : NEG [GonorrheaDate] : 02/28/2022 [ChlamydiaDate] : 02/28/2022 [TextBox_68] : NEG [HPVDate] : 12/01/2022 [LMPDate] : 01`/02/2024 [TextBox_78] : NEG [PGHxTotal] : 2 [[facility]] : 2 [[facility]xLiving] : 2 [FreeTextEntry1] : 01/02/2024

## 2024-04-09 ENCOUNTER — NON-APPOINTMENT (OUTPATIENT)
Age: 39
End: 2024-04-09

## 2024-04-10 ENCOUNTER — APPOINTMENT (OUTPATIENT)
Dept: FAMILY MEDICINE | Facility: CLINIC | Age: 39
End: 2024-04-10
Payer: COMMERCIAL

## 2024-04-10 VITALS
HEIGHT: 58 IN | DIASTOLIC BLOOD PRESSURE: 72 MMHG | OXYGEN SATURATION: 98 % | HEART RATE: 70 BPM | WEIGHT: 157 LBS | SYSTOLIC BLOOD PRESSURE: 122 MMHG | BODY MASS INDEX: 32.95 KG/M2 | TEMPERATURE: 97.2 F

## 2024-04-10 DIAGNOSIS — L23.9 ALLERGIC CONTACT DERMATITIS, UNSPECIFIED CAUSE: ICD-10-CM

## 2024-04-10 PROCEDURE — 99213 OFFICE O/P EST LOW 20 MIN: CPT

## 2024-04-11 RX ORDER — METRONIDAZOLE 7.5 MG/G
0.75 GEL VAGINAL
Qty: 1 | Refills: 0 | Status: COMPLETED | COMMUNITY
Start: 2024-01-11 | End: 2024-04-11

## 2024-04-11 NOTE — PHYSICAL EXAM
[No Acute Distress] : no acute distress [Well Nourished] : well nourished [Well Developed] : well developed [Well-Appearing] : well-appearing [EOMI] : extraocular movements intact [Normal Outer Ear/Nose] : the outer ears and nose were normal in appearance [No JVD] : no jugular venous distention [No Respiratory Distress] : no respiratory distress  [No Accessory Muscle Use] : no accessory muscle use [Clear to Auscultation] : lungs were clear to auscultation bilaterally [Normal Rate] : normal rate  [Regular Rhythm] : with a regular rhythm [Normal S1, S2] : normal S1 and S2 [No Carotid Bruits] : no carotid bruits [No Edema] : there was no peripheral edema [Non-distended] : non-distended [No CVA Tenderness] : no CVA  tenderness [Grossly Normal Strength/Tone] : grossly normal strength/tone [Coordination Grossly Intact] : coordination grossly intact [No Focal Deficits] : no focal deficits [Normal Gait] : normal gait [Speech Grossly Normal] : speech grossly normal [Normal Affect] : the affect was normal [Normal Mood] : the mood was normal [Normal Insight/Judgement] : insight and judgment were intact [de-identified] : hyperpigmented macular rash inframmary region B/L    +ve isolated erythematous papular lesions UE and LE B/L

## 2024-04-11 NOTE — PLAN
[FreeTextEntry1] : Keep area dry, cool,  and exposed to light whenever possible  see med orders    cool baths  non-fragranced soaps/body washes  see med orders   f/u if rash not resolved after  2 weeks

## 2024-04-11 NOTE — HISTORY OF PRESENT ILLNESS
[FreeTextEntry8] : pt c/o rash in the body  37 yo female c/o pruritic rash inframammary region B/L.  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

## 2024-06-27 ENCOUNTER — APPOINTMENT (OUTPATIENT)
Dept: FAMILY MEDICINE | Facility: CLINIC | Age: 39
End: 2024-06-27
Payer: COMMERCIAL

## 2024-06-27 VITALS
BODY MASS INDEX: 32.95 KG/M2 | HEART RATE: 75 BPM | HEIGHT: 58 IN | WEIGHT: 157 LBS | SYSTOLIC BLOOD PRESSURE: 112 MMHG | OXYGEN SATURATION: 98 % | DIASTOLIC BLOOD PRESSURE: 72 MMHG

## 2024-06-27 DIAGNOSIS — K76.0 FATTY (CHANGE OF) LIVER, NOT ELSEWHERE CLASSIFIED: ICD-10-CM

## 2024-06-27 DIAGNOSIS — Z86.19 PERSONAL HISTORY OF OTHER INFECTIOUS AND PARASITIC DISEASES: ICD-10-CM

## 2024-06-27 DIAGNOSIS — R06.83 SNORING: ICD-10-CM

## 2024-06-27 DIAGNOSIS — Z87.898 PERSONAL HISTORY OF OTHER SPECIFIED CONDITIONS: ICD-10-CM

## 2024-06-27 DIAGNOSIS — E78.5 HYPERLIPIDEMIA, UNSPECIFIED: ICD-10-CM

## 2024-06-27 DIAGNOSIS — R10.2 PELVIC AND PERINEAL PAIN: ICD-10-CM

## 2024-06-27 DIAGNOSIS — R16.0 HEPATOMEGALY, NOT ELSEWHERE CLASSIFIED: ICD-10-CM

## 2024-06-27 DIAGNOSIS — E66.9 OBESITY, UNSPECIFIED: ICD-10-CM

## 2024-06-27 DIAGNOSIS — I87.2 VENOUS INSUFFICIENCY (CHRONIC) (PERIPHERAL): ICD-10-CM

## 2024-06-27 DIAGNOSIS — Z87.42 PERSONAL HISTORY OF OTHER DISEASES OF THE FEMALE GENITAL TRACT: ICD-10-CM

## 2024-06-27 DIAGNOSIS — R06.09 OTHER FORMS OF DYSPNEA: ICD-10-CM

## 2024-06-27 DIAGNOSIS — R10.13 EPIGASTRIC PAIN: ICD-10-CM

## 2024-06-27 DIAGNOSIS — E55.9 VITAMIN D DEFICIENCY, UNSPECIFIED: ICD-10-CM

## 2024-06-27 DIAGNOSIS — Z11.3 ENCOUNTER FOR SCREENING FOR INFECTIONS WITH A PREDOMINANTLY SEXUAL MODE OF TRANSMISSION: ICD-10-CM

## 2024-06-27 DIAGNOSIS — L29.0 PRURITUS ANI: ICD-10-CM

## 2024-06-27 DIAGNOSIS — Z87.19 PERSONAL HISTORY OF OTHER DISEASES OF THE DIGESTIVE SYSTEM: ICD-10-CM

## 2024-06-27 DIAGNOSIS — R73.03 PREDIABETES.: ICD-10-CM

## 2024-06-27 DIAGNOSIS — N64.4 MASTODYNIA: ICD-10-CM

## 2024-06-27 DIAGNOSIS — Z13.29 ENCOUNTER FOR SCREENING FOR OTHER SUSPECTED ENDOCRINE DISORDER: ICD-10-CM

## 2024-06-27 DIAGNOSIS — B96.89 ACUTE VAGINITIS: ICD-10-CM

## 2024-06-27 DIAGNOSIS — Z00.00 ENCOUNTER FOR GENERAL ADULT MEDICAL EXAMINATION W/OUT ABNORMAL FINDINGS: ICD-10-CM

## 2024-06-27 DIAGNOSIS — Z23 ENCOUNTER FOR IMMUNIZATION: ICD-10-CM

## 2024-06-27 DIAGNOSIS — N88.2 STRICTURE AND STENOSIS OF CERVIX UTERI: ICD-10-CM

## 2024-06-27 DIAGNOSIS — N83.209 UNSPECIFIED OVARIAN CYST, UNSPECIFIED SIDE: ICD-10-CM

## 2024-06-27 DIAGNOSIS — N76.0 ACUTE VAGINITIS: ICD-10-CM

## 2024-06-27 PROCEDURE — 99395 PREV VISIT EST AGE 18-39: CPT

## 2024-06-28 ENCOUNTER — NON-APPOINTMENT (OUTPATIENT)
Age: 39
End: 2024-06-28

## 2024-07-26 RX ORDER — HYDROCORTISONE 10 MG/G
1 CREAM TOPICAL
Qty: 1 | Refills: 0 | Status: ACTIVE | COMMUNITY
Start: 2024-07-26 | End: 1900-01-01

## 2024-07-26 RX ORDER — ZINC OXIDE 200 MG/G
20 OINTMENT TOPICAL
Qty: 1 | Refills: 1 | Status: ACTIVE | COMMUNITY
Start: 2024-07-26 | End: 1900-01-01

## 2024-09-27 ENCOUNTER — APPOINTMENT (OUTPATIENT)
Dept: NEUROLOGY | Facility: CLINIC | Age: 39
End: 2024-09-27
Payer: COMMERCIAL

## 2024-09-27 VITALS
WEIGHT: 155 LBS | DIASTOLIC BLOOD PRESSURE: 80 MMHG | SYSTOLIC BLOOD PRESSURE: 124 MMHG | HEIGHT: 58 IN | BODY MASS INDEX: 32.54 KG/M2 | HEART RATE: 60 BPM

## 2024-09-27 DIAGNOSIS — G62.9 POLYNEUROPATHY, UNSPECIFIED: ICD-10-CM

## 2024-09-27 DIAGNOSIS — I67.1 CEREBRAL ANEURYSM, NONRUPTURED: ICD-10-CM

## 2024-09-27 PROCEDURE — 99204 OFFICE O/P NEW MOD 45 MIN: CPT

## 2024-09-27 RX ORDER — BACILLUS COAGULANS/INULIN 1B-250 MG
CAPSULE ORAL
Refills: 0 | Status: ACTIVE | COMMUNITY

## 2024-09-27 RX ORDER — UBIDECARENONE/VIT E ACET 100MG-5
CAPSULE ORAL
Refills: 0 | Status: ACTIVE | COMMUNITY

## 2024-09-27 NOTE — PHYSICAL EXAM
[FreeTextEntry1] : There is no cervical palpation tenderness. There is full range of movement of the cervical spine  There is no lumbar palpation tenderness. There is full range of movement of the lumbar spine. Straight leg raising is negative bilaterally. Brian's maneuver negative bilaterally.   [General Appearance - Alert] : alert [General Appearance - In No Acute Distress] : in no acute distress [General Appearance - Well-Appearing] : healthy appearing [Oriented To Time, Place, And Person] : oriented to person, place, and time [Impaired Insight] : insight and judgment were intact [Affect] : the affect was normal [Memory Recent] : recent memory was not impaired [Person] : oriented to person [Place] : oriented to place [Time] : oriented to time [Concentration Intact] : normal concentrating ability [Visual Intact] : visual attention was ~T not ~L decreased [Fluency] : fluency intact [Comprehension] : comprehension intact [Past History] : adequate knowledge of personal past history [Cranial Nerves Oculomotor (III)] : extraocular motion intact [Cranial Nerves Optic (II)] : visual acuity intact bilaterally,  visual fields full to confrontation, pupils equal round and reactive to light [Cranial Nerves Trigeminal (V)] : facial sensation intact symmetrically [Cranial Nerves Facial (VII)] : face symmetrical [Cranial Nerves Vestibulocochlear (VIII)] : hearing was intact bilaterally [Cranial Nerves Glossopharyngeal (IX)] : tongue and palate midline [Cranial Nerves Accessory (XI - Cranial And Spinal)] : head turning and shoulder shrug symmetric [Cranial Nerves Hypoglossal (XII)] : there was no tongue deviation with protrusion [Motor Tone] : muscle tone was normal in all four extremities [Motor Strength] : muscle strength was normal in all four extremities [No Muscle Atrophy] : normal bulk in all four extremities [Paresis Pronator Drift Right-Sided] : no pronator drift on the right [Paresis Pronator Drift Left-Sided] : no pronator drift on the left [Sensation Tactile Decrease] : light touch was intact [Sensation Pain / Temperature Decrease] : pain and temperature was intact [Proprioception] : proprioception was intact [Romberg's Sign] : Romberg's sign was negtive [Abnormal Walk] : normal gait [Balance] : balance was intact [Past-pointing] : there was no past-pointing [Tremor] : no tremor present [Coordination - Dysmetria Impaired Finger-to-Nose Bilateral] : not present [2+] : Ankle jerk left 2+

## 2024-09-27 NOTE — ASSESSMENT
[FreeTextEntry1] : She describes an uncomfortable feeling in her feet as discussed above Her neurological examination is entirely normal She will return for EMG and nerve conduction study for further evaluation  Mother with history of cerebral aneurysm and patient was told that she should be checked for this as it might be hereditary We will get her a brain MRI and MRA

## 2024-09-27 NOTE — HISTORY OF PRESENT ILLNESS
[FreeTextEntry1] : This 39-year-old woman was seen in neurological consultation today She reports an uncomfortable feeling in her feet going on for about the last 3 years She describes it as a feeling of tightness It is not related to activity Says that the feeling is there  all the time There is no significant pain Occasionally she gets a pins and needle sensation She has no significant back pain Said she saw podiatrist 2 years ago and a vascular specialist recently and nothing significant has been found  She also reports that her mother had a cerebral aneurysm about 12 years ago She was told it could be hereditary and that she should be checked out but she never pursued that She now wishes to be checked to see if she has any aneurysms Denies any specific related symptoms.  Medical history otherwise unremarkable  Works as a dental assistant.

## 2024-10-12 ENCOUNTER — OUTPATIENT (OUTPATIENT)
Dept: OUTPATIENT SERVICES | Facility: HOSPITAL | Age: 39
LOS: 1 days | End: 2024-10-12
Payer: COMMERCIAL

## 2024-10-12 ENCOUNTER — APPOINTMENT (OUTPATIENT)
Dept: MRI IMAGING | Facility: CLINIC | Age: 39
End: 2024-10-12

## 2024-10-12 DIAGNOSIS — Z00.8 ENCOUNTER FOR OTHER GENERAL EXAMINATION: ICD-10-CM

## 2024-10-12 DIAGNOSIS — Z98.891 HISTORY OF UTERINE SCAR FROM PREVIOUS SURGERY: Chronic | ICD-10-CM

## 2024-10-12 DIAGNOSIS — Z90.79 ACQUIRED ABSENCE OF OTHER GENITAL ORGAN(S): Chronic | ICD-10-CM

## 2024-10-12 PROCEDURE — 70553 MRI BRAIN STEM W/O & W/DYE: CPT | Mod: 26

## 2024-10-12 PROCEDURE — A9585: CPT

## 2024-10-12 PROCEDURE — 70544 MR ANGIOGRAPHY HEAD W/O DYE: CPT | Mod: 26,59

## 2024-10-12 PROCEDURE — 70544 MR ANGIOGRAPHY HEAD W/O DYE: CPT

## 2024-10-12 PROCEDURE — 70553 MRI BRAIN STEM W/O & W/DYE: CPT

## 2024-10-17 DIAGNOSIS — G62.9 POLYNEUROPATHY, UNSPECIFIED: ICD-10-CM

## 2024-10-18 ENCOUNTER — OFFICE (OUTPATIENT)
Dept: URBAN - METROPOLITAN AREA CLINIC 94 | Facility: CLINIC | Age: 39
Setting detail: OPHTHALMOLOGY
End: 2024-10-18
Payer: COMMERCIAL

## 2024-10-18 DIAGNOSIS — B58.09: ICD-10-CM

## 2024-10-18 DIAGNOSIS — H16.223: ICD-10-CM

## 2024-10-18 PROBLEM — H10.45: Status: ACTIVE | Noted: 2024-10-18

## 2024-10-18 PROCEDURE — 92250 FUNDUS PHOTOGRAPHY W/I&R: CPT | Performed by: OPHTHALMOLOGY

## 2024-10-18 PROCEDURE — 92014 COMPRE OPH EXAM EST PT 1/>: CPT | Performed by: OPHTHALMOLOGY

## 2024-10-18 ASSESSMENT — TEAR BREAK UP TIME (TBUT)
OS_TBUT: 4 SECONDS
OD_TBUT: 4 SECONDS

## 2024-10-18 ASSESSMENT — REFRACTION_AUTOREFRACTION
OD_AXIS: 157
OD_SPHERE: +0.25
OD_CYLINDER: -0.50
OS_AXIS: 058
OS_CYLINDER: -0.50
OS_SPHERE: +0.75

## 2024-10-18 ASSESSMENT — KERATOMETRY
OD_K1POWER_DIOPTERS: 43.50
OD_K2POWER_DIOPTERS: 45.00
OS_K2POWER_DIOPTERS: 44.00
OD_AXISANGLE_DEGREES: 081
OS_K1POWER_DIOPTERS: 43.25
OS_AXISANGLE_DEGREES: 109

## 2024-10-18 ASSESSMENT — VISUAL ACUITY
OD_BCVA: 20/25-1
OS_BCVA: 20/30-1

## 2024-10-18 ASSESSMENT — TONOMETRY
OS_IOP_MMHG: 14
OD_IOP_MMHG: 13

## 2024-10-18 ASSESSMENT — CONFRONTATIONAL VISUAL FIELD TEST (CVF)
OS_FINDINGS: FULL
OD_FINDINGS: FULL

## 2024-10-18 ASSESSMENT — REFRACTION_MANIFEST
OD_SPHERE: -0.50
OD_VA1: 20/25+

## 2024-10-28 ENCOUNTER — APPOINTMENT (OUTPATIENT)
Dept: NEUROLOGY | Facility: CLINIC | Age: 39
End: 2024-10-28
Payer: COMMERCIAL

## 2024-10-28 DIAGNOSIS — I67.1 CEREBRAL ANEURYSM, NONRUPTURED: ICD-10-CM

## 2024-10-28 PROCEDURE — 95886 MUSC TEST DONE W/N TEST COMP: CPT

## 2024-10-28 PROCEDURE — 95909 NRV CNDJ TST 5-6 STUDIES: CPT

## 2024-11-11 ENCOUNTER — APPOINTMENT (OUTPATIENT)
Dept: PODIATRY | Facility: CLINIC | Age: 39
End: 2024-11-11
Payer: COMMERCIAL

## 2024-11-11 DIAGNOSIS — Q66.40 CONGEN TALIPES CALCANEOVALGUS, UNSP FOOT: ICD-10-CM

## 2024-11-11 DIAGNOSIS — M76.821 POSTERIOR TIBIAL TENDINITIS, RIGHT LEG: ICD-10-CM

## 2024-11-11 DIAGNOSIS — M76.822 POSTERIOR TIBIAL TENDINITIS, RIGHT LEG: ICD-10-CM

## 2024-11-11 PROCEDURE — 73630 X-RAY EXAM OF FOOT: CPT | Mod: 50

## 2024-11-11 PROCEDURE — 99204 OFFICE O/P NEW MOD 45 MIN: CPT

## 2024-11-11 RX ORDER — MELOXICAM 15 MG/1
15 TABLET ORAL DAILY
Qty: 14 | Refills: 0 | Status: ACTIVE | COMMUNITY
Start: 2024-11-11 | End: 1900-01-01

## 2024-11-14 ENCOUNTER — APPOINTMENT (OUTPATIENT)
Dept: NEUROLOGY | Facility: CLINIC | Age: 39
End: 2024-11-14
Payer: COMMERCIAL

## 2024-11-14 VITALS
SYSTOLIC BLOOD PRESSURE: 128 MMHG | HEART RATE: 79 BPM | WEIGHT: 155 LBS | HEIGHT: 58 IN | BODY MASS INDEX: 32.54 KG/M2 | DIASTOLIC BLOOD PRESSURE: 76 MMHG | OXYGEN SATURATION: 99 %

## 2024-11-14 DIAGNOSIS — I67.1 CEREBRAL ANEURYSM, NONRUPTURED: ICD-10-CM

## 2024-11-14 DIAGNOSIS — G47.19 OTHER HYPERSOMNIA: ICD-10-CM

## 2024-11-14 PROCEDURE — 99205 OFFICE O/P NEW HI 60 MIN: CPT

## 2024-12-27 ENCOUNTER — APPOINTMENT (OUTPATIENT)
Dept: NEUROLOGY | Facility: CLINIC | Age: 39
End: 2024-12-27

## 2025-01-11 ENCOUNTER — APPOINTMENT (OUTPATIENT)
Dept: OBGYN | Facility: CLINIC | Age: 40
End: 2025-01-11
Payer: COMMERCIAL

## 2025-01-11 VITALS
SYSTOLIC BLOOD PRESSURE: 114 MMHG | HEIGHT: 58 IN | BODY MASS INDEX: 32.95 KG/M2 | WEIGHT: 157 LBS | DIASTOLIC BLOOD PRESSURE: 78 MMHG

## 2025-01-11 DIAGNOSIS — Z01.419 ENCOUNTER FOR GYNECOLOGICAL EXAMINATION (GENERAL) (ROUTINE) W/OUT ABNORMAL FINDINGS: ICD-10-CM

## 2025-01-11 DIAGNOSIS — N89.8 OTHER SPECIFIED NONINFLAMMATORY DISORDERS OF VAGINA: ICD-10-CM

## 2025-01-11 DIAGNOSIS — Z11.3 ENCOUNTER FOR SCREENING FOR INFECTIONS WITH A PREDOMINANTLY SEXUAL MODE OF TRANSMISSION: ICD-10-CM

## 2025-01-11 DIAGNOSIS — D25.9 LEIOMYOMA OF UTERUS, UNSPECIFIED: ICD-10-CM

## 2025-01-11 PROCEDURE — 99395 PREV VISIT EST AGE 18-39: CPT

## 2025-01-11 PROCEDURE — 99459 PELVIC EXAMINATION: CPT

## 2025-01-21 ENCOUNTER — ASOB RESULT (OUTPATIENT)
Age: 40
End: 2025-01-21

## 2025-01-21 ENCOUNTER — APPOINTMENT (OUTPATIENT)
Dept: OBGYN | Facility: CLINIC | Age: 40
End: 2025-01-21

## 2025-01-21 PROCEDURE — 76830 TRANSVAGINAL US NON-OB: CPT

## 2025-01-21 PROCEDURE — 76857 US EXAM PELVIC LIMITED: CPT | Mod: 59

## 2025-02-19 ENCOUNTER — APPOINTMENT (OUTPATIENT)
Dept: PODIATRY | Facility: CLINIC | Age: 40
End: 2025-02-19

## 2025-03-22 ENCOUNTER — APPOINTMENT (OUTPATIENT)
Dept: PODIATRY | Facility: CLINIC | Age: 40
End: 2025-03-22

## 2025-04-15 ENCOUNTER — APPOINTMENT (OUTPATIENT)
Dept: FAMILY MEDICINE | Facility: CLINIC | Age: 40
End: 2025-04-15
Payer: COMMERCIAL

## 2025-04-15 VITALS
SYSTOLIC BLOOD PRESSURE: 118 MMHG | OXYGEN SATURATION: 99 % | HEART RATE: 67 BPM | WEIGHT: 160 LBS | DIASTOLIC BLOOD PRESSURE: 70 MMHG | BODY MASS INDEX: 33.58 KG/M2 | HEIGHT: 58 IN

## 2025-04-15 DIAGNOSIS — R06.83 SNORING: ICD-10-CM

## 2025-04-15 DIAGNOSIS — R53.83 OTHER FATIGUE: ICD-10-CM

## 2025-04-15 DIAGNOSIS — E78.5 HYPERLIPIDEMIA, UNSPECIFIED: ICD-10-CM

## 2025-04-15 DIAGNOSIS — K76.0 FATTY (CHANGE OF) LIVER, NOT ELSEWHERE CLASSIFIED: ICD-10-CM

## 2025-04-15 DIAGNOSIS — R73.03 PREDIABETES.: ICD-10-CM

## 2025-04-15 PROCEDURE — 99214 OFFICE O/P EST MOD 30 MIN: CPT

## 2025-04-21 DIAGNOSIS — E83.19 OTHER DISORDERS OF IRON METABOLISM: ICD-10-CM

## 2025-04-21 LAB
ALBUMIN SERPL ELPH-MCNC: 4.2 G/DL
ALP BLD-CCNC: 86 U/L
ALT SERPL-CCNC: 51 U/L
ANION GAP SERPL CALC-SCNC: 12 MMOL/L
APPEARANCE: CLEAR
AST SERPL-CCNC: 52 U/L
BACTERIA: NEGATIVE /HPF
BASOPHILS # BLD AUTO: 0.04 K/UL
BASOPHILS NFR BLD AUTO: 0.5 %
BILIRUB SERPL-MCNC: 0.7 MG/DL
BILIRUBIN URINE: NEGATIVE
BLOOD URINE: NEGATIVE
BUN SERPL-MCNC: 11 MG/DL
CALCIUM SERPL-MCNC: 9.7 MG/DL
CAST: 0 /LPF
CHLORIDE SERPL-SCNC: 102 MMOL/L
CHOLEST SERPL-MCNC: 220 MG/DL
CO2 SERPL-SCNC: 25 MMOL/L
COLOR: YELLOW
CREAT SERPL-MCNC: 0.67 MG/DL
EGFRCR SERPLBLD CKD-EPI 2021: 114 ML/MIN/1.73M2
EOSINOPHIL # BLD AUTO: 0.07 K/UL
EOSINOPHIL NFR BLD AUTO: 0.9 %
EPITHELIAL CELLS: 2 /HPF
ESTIMATED AVERAGE GLUCOSE: 117 MG/DL
FERRITIN SERPL-MCNC: 30 NG/ML
FOLATE SERPL-MCNC: 14.3 NG/ML
GLUCOSE QUALITATIVE U: NEGATIVE MG/DL
GLUCOSE SERPL-MCNC: 89 MG/DL
HBA1C MFR BLD HPLC: 5.7 %
HCT VFR BLD CALC: 39.8 %
HDLC SERPL-MCNC: 51 MG/DL
HGB BLD-MCNC: 13.2 G/DL
IMM GRANULOCYTES NFR BLD AUTO: 0.3 %
IRON SATN MFR SERPL: 39 %
IRON SERPL-MCNC: 176 UG/DL
KETONES URINE: NEGATIVE MG/DL
LDLC SERPL-MCNC: 142 MG/DL
LEUKOCYTE ESTERASE URINE: NEGATIVE
LYMPHOCYTES # BLD AUTO: 1.73 K/UL
LYMPHOCYTES NFR BLD AUTO: 22.2 %
MAN DIFF?: NORMAL
MCHC RBC-ENTMCNC: 30.3 PG
MCHC RBC-ENTMCNC: 33.2 G/DL
MCV RBC AUTO: 91.5 FL
MICROSCOPIC-UA: NORMAL
MONOCYTES # BLD AUTO: 0.57 K/UL
MONOCYTES NFR BLD AUTO: 7.3 %
NEUTROPHILS # BLD AUTO: 5.35 K/UL
NEUTROPHILS NFR BLD AUTO: 68.8 %
NITRITE URINE: NEGATIVE
NONHDLC SERPL-MCNC: 169 MG/DL
PH URINE: 6.5
PLATELET # BLD AUTO: 277 K/UL
POTASSIUM SERPL-SCNC: 4.1 MMOL/L
PROT SERPL-MCNC: 7.6 G/DL
PROTEIN URINE: NEGATIVE MG/DL
RBC # BLD: 4.35 M/UL
RBC # FLD: 13.5 %
RED BLOOD CELLS URINE: 0 /HPF
SODIUM SERPL-SCNC: 139 MMOL/L
SPECIFIC GRAVITY URINE: 1.01
TIBC SERPL-MCNC: 454 UG/DL
TRANSFERRIN SERPL-MCNC: 361 MG/DL
TRIGL SERPL-MCNC: 151 MG/DL
TSH SERPL-ACNC: 1.59 UIU/ML
UIBC SERPL-MCNC: 279 UG/DL
UROBILINOGEN URINE: 0.2 MG/DL
VIT B12 SERPL-MCNC: 724 PG/ML
WBC # FLD AUTO: 7.78 K/UL
WHITE BLOOD CELLS URINE: 0 /HPF

## 2025-05-01 ENCOUNTER — OUTPATIENT (OUTPATIENT)
Dept: OUTPATIENT SERVICES | Facility: HOSPITAL | Age: 40
LOS: 1 days | End: 2025-05-01
Payer: COMMERCIAL

## 2025-05-01 DIAGNOSIS — G47.33 OBSTRUCTIVE SLEEP APNEA (ADULT) (PEDIATRIC): ICD-10-CM

## 2025-05-01 DIAGNOSIS — Z98.891 HISTORY OF UTERINE SCAR FROM PREVIOUS SURGERY: Chronic | ICD-10-CM

## 2025-05-01 DIAGNOSIS — Z90.79 ACQUIRED ABSENCE OF OTHER GENITAL ORGAN(S): Chronic | ICD-10-CM

## 2025-05-01 PROCEDURE — 95800 SLP STDY UNATTENDED: CPT

## 2025-05-01 PROCEDURE — 95800 SLP STDY UNATTENDED: CPT | Mod: 26

## 2025-05-13 ENCOUNTER — APPOINTMENT (OUTPATIENT)
Dept: FAMILY MEDICINE | Facility: CLINIC | Age: 40
End: 2025-05-13
Payer: COMMERCIAL

## 2025-05-13 VITALS
WEIGHT: 160 LBS | BODY MASS INDEX: 33.58 KG/M2 | DIASTOLIC BLOOD PRESSURE: 70 MMHG | OXYGEN SATURATION: 98 % | SYSTOLIC BLOOD PRESSURE: 110 MMHG | HEIGHT: 58 IN | HEART RATE: 66 BPM

## 2025-05-13 DIAGNOSIS — R73.03 PREDIABETES.: ICD-10-CM

## 2025-05-13 DIAGNOSIS — E78.5 HYPERLIPIDEMIA, UNSPECIFIED: ICD-10-CM

## 2025-05-13 DIAGNOSIS — K76.0 FATTY (CHANGE OF) LIVER, NOT ELSEWHERE CLASSIFIED: ICD-10-CM

## 2025-05-13 DIAGNOSIS — G47.33 OBSTRUCTIVE SLEEP APNEA (ADULT) (PEDIATRIC): ICD-10-CM

## 2025-05-13 PROCEDURE — 99214 OFFICE O/P EST MOD 30 MIN: CPT

## 2025-05-14 DIAGNOSIS — E83.19 OTHER DISORDERS OF IRON METABOLISM: ICD-10-CM

## 2025-05-14 DIAGNOSIS — E61.1 IRON DEFICIENCY: ICD-10-CM

## 2025-05-14 LAB
FERRITIN SERPL-MCNC: 10 NG/ML
IRON SATN MFR SERPL: 7 %
IRON SERPL-MCNC: 30 UG/DL
TIBC SERPL-MCNC: 402 UG/DL
TRANSFERRIN SERPL-MCNC: 342 MG/DL
UIBC SERPL-MCNC: 372 UG/DL

## 2025-05-14 RX ORDER — FERROUS SULFATE TAB EC 325 MG (65 MG FE EQUIVALENT) 325 (65 FE) MG
325 (65 FE) TABLET DELAYED RESPONSE ORAL
Qty: 30 | Refills: 1 | Status: ACTIVE | COMMUNITY
Start: 2025-05-14 | End: 1900-01-01

## 2025-06-11 ENCOUNTER — APPOINTMENT (OUTPATIENT)
Dept: GASTROENTEROLOGY | Facility: CLINIC | Age: 40
End: 2025-06-11
Payer: COMMERCIAL

## 2025-06-11 VITALS
HEART RATE: 68 BPM | DIASTOLIC BLOOD PRESSURE: 87 MMHG | BODY MASS INDEX: 33.8 KG/M2 | OXYGEN SATURATION: 98 % | RESPIRATION RATE: 14 BRPM | SYSTOLIC BLOOD PRESSURE: 131 MMHG | WEIGHT: 161 LBS | HEIGHT: 58 IN

## 2025-06-11 PROBLEM — R74.8 ELEVATED LIVER ENZYMES: Status: ACTIVE | Noted: 2025-06-11

## 2025-06-11 PROBLEM — K64.1 GRADE II HEMORRHOIDS: Status: ACTIVE | Noted: 2025-06-11

## 2025-06-11 PROBLEM — D50.8 OTHER IRON DEFICIENCY ANEMIA: Status: ACTIVE | Noted: 2025-06-11

## 2025-06-11 PROBLEM — R14.0 BLOATED ABDOMEN: Status: ACTIVE | Noted: 2025-06-11

## 2025-06-11 PROCEDURE — 99204 OFFICE O/P NEW MOD 45 MIN: CPT

## 2025-06-17 RX ORDER — POLYETHYLENE GLYCOL 3350, SODIUM SULFATE, POTASSIUM CHLORIDE, MAGNESIUM SULFATE, AND SODIUM CHLORIDE FOR ORAL SOLUTION 178.7-7.3G
178.7 KIT ORAL
Qty: 1 | Refills: 0 | Status: ACTIVE | COMMUNITY
Start: 2025-06-11 | End: 1900-01-01

## 2025-06-17 RX ORDER — HYDROCORTISONE 25 MG/G
2.5 CREAM TOPICAL TWICE DAILY
Qty: 1 | Refills: 3 | Status: ACTIVE | COMMUNITY
Start: 2025-06-11 | End: 1900-01-01

## 2025-07-18 ENCOUNTER — APPOINTMENT (OUTPATIENT)
Dept: PULMONOLOGY | Facility: CLINIC | Age: 40
End: 2025-07-18
Payer: COMMERCIAL

## 2025-07-18 VITALS
SYSTOLIC BLOOD PRESSURE: 128 MMHG | RESPIRATION RATE: 16 BRPM | OXYGEN SATURATION: 98 % | BODY MASS INDEX: 32.95 KG/M2 | WEIGHT: 157 LBS | HEIGHT: 58 IN | DIASTOLIC BLOOD PRESSURE: 84 MMHG | HEART RATE: 74 BPM

## 2025-07-18 PROBLEM — R06.02 SOBOE (SHORTNESS OF BREATH ON EXERTION): Status: ACTIVE | Noted: 2025-07-18

## 2025-07-18 PROBLEM — Z86.16 HISTORY OF COVID-19: Status: ACTIVE | Noted: 2025-07-18

## 2025-07-18 PROCEDURE — 99214 OFFICE O/P EST MOD 30 MIN: CPT

## 2025-08-13 ENCOUNTER — OFFICE (OUTPATIENT)
Dept: URBAN - METROPOLITAN AREA CLINIC 115 | Facility: CLINIC | Age: 40
Setting detail: OPHTHALMOLOGY
End: 2025-08-13
Payer: COMMERCIAL

## 2025-08-13 DIAGNOSIS — H16.223: ICD-10-CM

## 2025-08-13 DIAGNOSIS — T15.02XA: ICD-10-CM

## 2025-08-13 PROCEDURE — 92012 INTRM OPH EXAM EST PATIENT: CPT | Performed by: OPTOMETRIST

## 2025-08-13 ASSESSMENT — SUPERFICIAL PUNCTATE KERATITIS (SPK)
OD_SPK: 3+ 4+
OS_SPK: 3+ 4+

## 2025-08-13 ASSESSMENT — REFRACTION_AUTOREFRACTION
OS_AXIS: 061
OS_SPHERE: +0.25
OS_CYLINDER: --0.75
OD_SPHERE: 0.00
OD_CYLINDER: -0.50
OD_AXIS: 167

## 2025-08-13 ASSESSMENT — CORNEAL TRAUMA - FOREIGN BODY: OD_FOREIGNBODY: ABSENT

## 2025-08-13 ASSESSMENT — KERATOMETRY
OD_K1POWER_DIOPTERS: 43.50
OD_K2POWER_DIOPTERS: 45.00
OS_K1POWER_DIOPTERS: 43.25
OS_K2POWER_DIOPTERS: 44.00
OS_AXISANGLE_DEGREES: 109
OD_AXISANGLE_DEGREES: 081

## 2025-08-13 ASSESSMENT — VISUAL ACUITY
OS_BCVA: 20/40
OD_BCVA: 20/30-1

## 2025-08-13 ASSESSMENT — TEAR BREAK UP TIME (TBUT)
OD_TBUT: 4 SECONDS
OS_TBUT: 4 SECONDS

## 2025-08-13 ASSESSMENT — REFRACTION_MANIFEST
OD_SPHERE: -0.50
OD_VA1: 20/25+

## 2025-08-13 ASSESSMENT — CONFRONTATIONAL VISUAL FIELD TEST (CVF)
OD_FINDINGS: FULL
OS_FINDINGS: FULL

## 2025-08-26 ENCOUNTER — APPOINTMENT (OUTPATIENT)
Dept: ORTHOPEDIC SURGERY | Facility: CLINIC | Age: 40
End: 2025-08-26

## 2025-09-04 ENCOUNTER — APPOINTMENT (OUTPATIENT)
Dept: ORTHOPEDIC SURGERY | Facility: CLINIC | Age: 40
End: 2025-09-04